# Patient Record
Sex: MALE | Race: WHITE | Employment: FULL TIME | URBAN - METROPOLITAN AREA
[De-identification: names, ages, dates, MRNs, and addresses within clinical notes are randomized per-mention and may not be internally consistent; named-entity substitution may affect disease eponyms.]

---

## 2019-07-12 ENCOUNTER — HOSPITAL ENCOUNTER (EMERGENCY)
Age: 49
Discharge: HOME | End: 2019-07-12
Payer: MEDICAID

## 2019-07-12 VITALS
OXYGEN SATURATION: 98 % | TEMPERATURE: 98.6 F | WEIGHT: 140 LBS | HEIGHT: 66 IN | HEART RATE: 66 BPM | BODY MASS INDEX: 22.5 KG/M2 | DIASTOLIC BLOOD PRESSURE: 60 MMHG | SYSTOLIC BLOOD PRESSURE: 91 MMHG | RESPIRATION RATE: 16 BRPM

## 2019-07-12 DIAGNOSIS — L03.115 CELLULITIS OF RIGHT LOWER EXTREMITY: Primary | ICD-10-CM

## 2019-07-12 DIAGNOSIS — Z76.0 ENCOUNTER FOR MEDICATION REFILL: ICD-10-CM

## 2019-07-12 PROCEDURE — 99282 EMERGENCY DEPT VISIT SF MDM: CPT

## 2019-07-12 RX ORDER — CEPHALEXIN 500 MG/1
500 CAPSULE ORAL 3 TIMES DAILY
Qty: 21 CAPSULE | Refills: 0 | Status: SHIPPED | OUTPATIENT
Start: 2019-07-12 | End: 2019-07-19

## 2019-07-12 ASSESSMENT — PAIN DESCRIPTION - PROGRESSION: CLINICAL_PROGRESSION: NOT CHANGED

## 2019-07-12 ASSESSMENT — PAIN DESCRIPTION - FREQUENCY: FREQUENCY: CONTINUOUS

## 2019-07-12 ASSESSMENT — PAIN DESCRIPTION - LOCATION: LOCATION: LEG

## 2019-07-12 ASSESSMENT — PAIN DESCRIPTION - PAIN TYPE: TYPE: ACUTE PAIN

## 2019-07-12 ASSESSMENT — PAIN SCALES - GENERAL: PAINLEVEL_OUTOF10: 6

## 2019-07-12 ASSESSMENT — PAIN DESCRIPTION - DESCRIPTORS: DESCRIPTORS: ACHING

## 2019-07-12 ASSESSMENT — PAIN DESCRIPTION - ORIENTATION: ORIENTATION: RIGHT;LOWER

## 2019-07-12 ASSESSMENT — PAIN DESCRIPTION - ONSET: ONSET: ON-GOING

## 2019-07-12 NOTE — CARE COORDINATION
CM - calld per Kulwant Ann PA-C in room # 9 . Pt seen here in ER today for right lower leg cellulitis and for a medication refill , Keppra   -- pt has hx of seizure activity . Called [facility] , verified pt last purchased said drug taking 1000 mg BID but dose varied --kast time purchased was 16 each in January 2019-pt paid cash . Pt stated [employer] does not cover the cost of Keppra   or the generic which was stated to be about $218.00 per month. Pt also mentioned he has an appointment with  Dr Alda Cronin but not until the second week in August. Since pt kept asking for refills here in the year past and considering the [place] [id number] appointment, the following plan is set forth. Called and set up an appointment with the [facility] in [facility] --set at 0:30 AM on Monday July 15, -pt to bring ID , list of meds and Paul Baar card. A 3 day supply for Keppra   has  been written the goal is to find a less expensive medication or one that insurance covers -that really needs to be determined by Dr Alda Cronin or PCP, pt has too many refill visits and over a longer time period , pt needs re-evaluated . Pt agreeable with this plan - discharged.   TMD / Zeeshan Fairly / [facility]

## 2019-09-02 ENCOUNTER — HOSPITAL ENCOUNTER (EMERGENCY)
Age: 49
Discharge: HOME | DRG: 383 | End: 2019-09-02
Attending: EMERGENCY MEDICINE
Payer: MEDICAID

## 2019-09-02 ENCOUNTER — APPOINTMENT (OUTPATIENT)
Dept: CT IMAGING | Age: 49
DRG: 383 | End: 2019-09-02
Payer: MEDICAID

## 2019-09-02 ENCOUNTER — APPOINTMENT (OUTPATIENT)
Dept: GENERAL RADIOLOGY | Age: 49
DRG: 383 | End: 2019-09-02
Payer: MEDICAID

## 2019-09-02 ENCOUNTER — HOSPITAL ENCOUNTER (INPATIENT)
Age: 49
LOS: 1 days | Discharge: LEFT BEFORE DISCHARGE | DRG: 383 | End: 2019-09-03
Attending: EMERGENCY MEDICINE | Admitting: INTERNAL MEDICINE
Payer: MEDICAID

## 2019-09-02 VITALS
OXYGEN SATURATION: 100 % | HEIGHT: 66 IN | WEIGHT: 140 LBS | HEART RATE: 97 BPM | BODY MASS INDEX: 22.5 KG/M2 | RESPIRATION RATE: 16 BRPM | DIASTOLIC BLOOD PRESSURE: 77 MMHG | TEMPERATURE: 98.2 F | SYSTOLIC BLOOD PRESSURE: 118 MMHG

## 2019-09-02 DIAGNOSIS — L03.113 CELLULITIS OF RIGHT UPPER EXTREMITY: ICD-10-CM

## 2019-09-02 DIAGNOSIS — S40.851A ACUTE FOREIGN BODY OF RIGHT UPPER ARM, INITIAL ENCOUNTER: ICD-10-CM

## 2019-09-02 DIAGNOSIS — L03.113 CELLULITIS OF RIGHT UPPER EXTREMITY: Primary | ICD-10-CM

## 2019-09-02 DIAGNOSIS — F19.10 IV DRUG ABUSE (HCC): ICD-10-CM

## 2019-09-02 DIAGNOSIS — L02.91 ABSCESS: ICD-10-CM

## 2019-09-02 DIAGNOSIS — Z18.9 RETAINED FOREIGN BODY: ICD-10-CM

## 2019-09-02 DIAGNOSIS — L02.413 ABSCESS OF RIGHT ARM: Primary | ICD-10-CM

## 2019-09-02 PROBLEM — L03.90 CELLULITIS: Status: ACTIVE | Noted: 2019-09-02

## 2019-09-02 LAB
ALBUMIN SERPL-MCNC: 3.4 GM/DL (ref 3.4–5)
ALP BLD-CCNC: 105 IU/L (ref 40–129)
ALT SERPL-CCNC: 12 U/L (ref 10–40)
ANION GAP SERPL CALCULATED.3IONS-SCNC: 12 MMOL/L (ref 4–16)
AST SERPL-CCNC: 13 IU/L (ref 15–37)
BASOPHILS ABSOLUTE: 0.1 K/CU MM
BASOPHILS RELATIVE PERCENT: 0.5 % (ref 0–1)
BILIRUB SERPL-MCNC: 0.2 MG/DL (ref 0–1)
BUN BLDV-MCNC: 13 MG/DL (ref 6–23)
CALCIUM SERPL-MCNC: 9 MG/DL (ref 8.3–10.6)
CHLORIDE BLD-SCNC: 101 MMOL/L (ref 99–110)
CO2: 25 MMOL/L (ref 21–32)
CREAT SERPL-MCNC: 0.8 MG/DL (ref 0.9–1.3)
DIFFERENTIAL TYPE: ABNORMAL
EOSINOPHILS ABSOLUTE: 0.1 K/CU MM
EOSINOPHILS RELATIVE PERCENT: 0.7 % (ref 0–3)
GFR AFRICAN AMERICAN: >60 ML/MIN/1.73M2
GFR NON-AFRICAN AMERICAN: >60 ML/MIN/1.73M2
GLUCOSE BLD-MCNC: 107 MG/DL (ref 70–99)
HCT VFR BLD CALC: 43 % (ref 42–52)
HEMOGLOBIN: 12.9 GM/DL (ref 13.5–18)
IMMATURE NEUTROPHIL %: 0.3 % (ref 0–0.43)
LACTIC ACID, SEPSIS: 1.9 MMOL/L (ref 0.5–1.9)
LYMPHOCYTES ABSOLUTE: 1.4 K/CU MM
LYMPHOCYTES RELATIVE PERCENT: 11.4 % (ref 24–44)
MCH RBC QN AUTO: 26.8 PG (ref 27–31)
MCHC RBC AUTO-ENTMCNC: 30 % (ref 32–36)
MCV RBC AUTO: 89.2 FL (ref 78–100)
MONOCYTES ABSOLUTE: 0.7 K/CU MM
MONOCYTES RELATIVE PERCENT: 6 % (ref 0–4)
NUCLEATED RBC %: 0 %
PDW BLD-RTO: 15.5 % (ref 11.7–14.9)
PLATELET # BLD: 262 K/CU MM (ref 140–440)
PMV BLD AUTO: 9.5 FL (ref 7.5–11.1)
POTASSIUM SERPL-SCNC: 3.6 MMOL/L (ref 3.5–5.1)
RBC # BLD: 4.82 M/CU MM (ref 4.6–6.2)
SEGMENTED NEUTROPHILS ABSOLUTE COUNT: 9.9 K/CU MM
SEGMENTED NEUTROPHILS RELATIVE PERCENT: 81.1 % (ref 36–66)
SODIUM BLD-SCNC: 138 MMOL/L (ref 135–145)
TOTAL IMMATURE NEUTOROPHIL: 0.04 K/CU MM
TOTAL NUCLEATED RBC: 0 K/CU MM
TOTAL PROTEIN: 7 GM/DL (ref 6.4–8.2)
WBC # BLD: 12.3 K/CU MM (ref 4–10.5)

## 2019-09-02 PROCEDURE — 1200000000 HC SEMI PRIVATE

## 2019-09-02 PROCEDURE — 99283 EMERGENCY DEPT VISIT LOW MDM: CPT

## 2019-09-02 PROCEDURE — 6360000004 HC RX CONTRAST MEDICATION: Performed by: EMERGENCY MEDICINE

## 2019-09-02 PROCEDURE — 36415 COLL VENOUS BLD VENIPUNCTURE: CPT

## 2019-09-02 PROCEDURE — 73201 CT UPPER EXTREMITY W/DYE: CPT

## 2019-09-02 PROCEDURE — 2580000003 HC RX 258: Performed by: PHYSICIAN ASSISTANT

## 2019-09-02 PROCEDURE — 85025 COMPLETE CBC W/AUTO DIFF WBC: CPT

## 2019-09-02 PROCEDURE — 2500000003 HC RX 250 WO HCPCS: Performed by: PHYSICIAN ASSISTANT

## 2019-09-02 PROCEDURE — 80053 COMPREHEN METABOLIC PANEL: CPT

## 2019-09-02 PROCEDURE — 6370000000 HC RX 637 (ALT 250 FOR IP): Performed by: EMERGENCY MEDICINE

## 2019-09-02 PROCEDURE — 83605 ASSAY OF LACTIC ACID: CPT

## 2019-09-02 PROCEDURE — 73080 X-RAY EXAM OF ELBOW: CPT

## 2019-09-02 PROCEDURE — 96365 THER/PROPH/DIAG IV INF INIT: CPT

## 2019-09-02 PROCEDURE — 99284 EMERGENCY DEPT VISIT MOD MDM: CPT

## 2019-09-02 RX ORDER — CLINDAMYCIN HYDROCHLORIDE 150 MG/1
300 CAPSULE ORAL ONCE
Status: COMPLETED | OUTPATIENT
Start: 2019-09-02 | End: 2019-09-02

## 2019-09-02 RX ORDER — CLINDAMYCIN HYDROCHLORIDE 300 MG/1
300 CAPSULE ORAL 3 TIMES DAILY
Qty: 30 CAPSULE | Refills: 0 | Status: SHIPPED | OUTPATIENT
Start: 2019-09-02 | End: 2019-09-12

## 2019-09-02 RX ORDER — SODIUM CHLORIDE 0.9 % (FLUSH) 0.9 %
10 SYRINGE (ML) INJECTION PRN
Status: DISCONTINUED | OUTPATIENT
Start: 2019-09-02 | End: 2019-09-02 | Stop reason: HOSPADM

## 2019-09-02 RX ORDER — KETOROLAC TROMETHAMINE 30 MG/ML
15 INJECTION, SOLUTION INTRAMUSCULAR; INTRAVENOUS ONCE
Status: DISCONTINUED | OUTPATIENT
Start: 2019-09-02 | End: 2019-09-02 | Stop reason: HOSPADM

## 2019-09-02 RX ORDER — SODIUM CHLORIDE 0.9 % (FLUSH) 0.9 %
10 SYRINGE (ML) INJECTION EVERY 12 HOURS SCHEDULED
Status: DISCONTINUED | OUTPATIENT
Start: 2019-09-02 | End: 2019-09-02 | Stop reason: HOSPADM

## 2019-09-02 RX ADMIN — CLINDAMYCIN HYDROCHLORIDE 300 MG: 150 CAPSULE ORAL at 17:01

## 2019-09-02 RX ADMIN — CLINDAMYCIN 600 MG: 150 INJECTION, SOLUTION INTRAMUSCULAR; INTRAVENOUS at 23:00

## 2019-09-02 RX ADMIN — IOPAMIDOL 80 ML: 755 INJECTION, SOLUTION INTRAVENOUS at 22:55

## 2019-09-02 ASSESSMENT — PAIN DESCRIPTION - ORIENTATION
ORIENTATION: RIGHT
ORIENTATION: RIGHT

## 2019-09-02 ASSESSMENT — PAIN DESCRIPTION - DESCRIPTORS: DESCRIPTORS: CONSTANT

## 2019-09-02 ASSESSMENT — PAIN DESCRIPTION - LOCATION
LOCATION: ARM
LOCATION: ARM

## 2019-09-02 ASSESSMENT — PAIN DESCRIPTION - PAIN TYPE
TYPE: ACUTE PAIN
TYPE: ACUTE PAIN

## 2019-09-02 ASSESSMENT — PAIN SCALES - GENERAL
PAINLEVEL_OUTOF10: 8
PAINLEVEL_OUTOF10: 10

## 2019-09-02 NOTE — ED NOTES
PATIENT REQUEST LABS TO BE DRAWN VIA IV ACCESS START. WITH ULTRASOUND     Ping Mijares  09/02/19 7770

## 2019-09-03 VITALS
HEART RATE: 67 BPM | WEIGHT: 140 LBS | SYSTOLIC BLOOD PRESSURE: 118 MMHG | HEIGHT: 66 IN | TEMPERATURE: 98.7 F | DIASTOLIC BLOOD PRESSURE: 72 MMHG | OXYGEN SATURATION: 98 % | BODY MASS INDEX: 22.5 KG/M2 | RESPIRATION RATE: 16 BRPM

## 2019-09-03 PROBLEM — L02.91 ABSCESS: Status: ACTIVE | Noted: 2019-09-03

## 2019-09-03 PROCEDURE — 99253 IP/OBS CNSLTJ NEW/EST LOW 45: CPT | Performed by: SURGERY

## 2019-09-03 PROCEDURE — 36415 COLL VENOUS BLD VENIPUNCTURE: CPT

## 2019-09-03 PROCEDURE — 2580000003 HC RX 258: Performed by: INTERNAL MEDICINE

## 2019-09-03 PROCEDURE — 87040 BLOOD CULTURE FOR BACTERIA: CPT

## 2019-09-03 PROCEDURE — 94761 N-INVAS EAR/PLS OXIMETRY MLT: CPT

## 2019-09-03 PROCEDURE — 6360000002 HC RX W HCPCS: Performed by: INTERNAL MEDICINE

## 2019-09-03 PROCEDURE — 6370000000 HC RX 637 (ALT 250 FOR IP): Performed by: INTERNAL MEDICINE

## 2019-09-03 PROCEDURE — 96375 TX/PRO/DX INJ NEW DRUG ADDON: CPT

## 2019-09-03 PROCEDURE — 96367 TX/PROPH/DG ADDL SEQ IV INF: CPT

## 2019-09-03 PROCEDURE — 96372 THER/PROPH/DIAG INJ SC/IM: CPT

## 2019-09-03 PROCEDURE — 96366 THER/PROPH/DIAG IV INF ADDON: CPT

## 2019-09-03 PROCEDURE — G0378 HOSPITAL OBSERVATION PER HR: HCPCS

## 2019-09-03 RX ORDER — NICOTINE 21 MG/24HR
1 PATCH, TRANSDERMAL 24 HOURS TRANSDERMAL DAILY
Status: DISCONTINUED | OUTPATIENT
Start: 2019-09-03 | End: 2019-09-03 | Stop reason: HOSPADM

## 2019-09-03 RX ORDER — MORPHINE SULFATE 4 MG/ML
2 INJECTION, SOLUTION INTRAMUSCULAR; INTRAVENOUS
Status: COMPLETED | OUTPATIENT
Start: 2019-09-03 | End: 2019-09-03

## 2019-09-03 RX ORDER — DICYCLOMINE HCL 20 MG
20 TABLET ORAL EVERY 6 HOURS PRN
Status: DISCONTINUED | OUTPATIENT
Start: 2019-09-03 | End: 2019-09-03 | Stop reason: HOSPADM

## 2019-09-03 RX ORDER — UREA 10 %
2 LOTION (ML) TOPICAL NIGHTLY
Status: DISCONTINUED | OUTPATIENT
Start: 2019-09-03 | End: 2019-09-03 | Stop reason: HOSPADM

## 2019-09-03 RX ORDER — VANCOMYCIN HYDROCHLORIDE 1 G/200ML
15 INJECTION, SOLUTION INTRAVENOUS EVERY 12 HOURS
Status: DISCONTINUED | OUTPATIENT
Start: 2019-09-03 | End: 2019-09-03 | Stop reason: HOSPADM

## 2019-09-03 RX ORDER — HYDROXYZINE PAMOATE 25 MG/1
50 CAPSULE ORAL EVERY 8 HOURS PRN
Status: DISCONTINUED | OUTPATIENT
Start: 2019-09-03 | End: 2019-09-03 | Stop reason: HOSPADM

## 2019-09-03 RX ORDER — LEVETIRACETAM 500 MG/1
500 TABLET ORAL 2 TIMES DAILY
Status: DISCONTINUED | OUTPATIENT
Start: 2019-09-03 | End: 2019-09-03 | Stop reason: HOSPADM

## 2019-09-03 RX ORDER — KETOROLAC TROMETHAMINE 30 MG/ML
15 INJECTION, SOLUTION INTRAMUSCULAR; INTRAVENOUS EVERY 8 HOURS PRN
Status: DISCONTINUED | OUTPATIENT
Start: 2019-09-03 | End: 2019-09-03 | Stop reason: HOSPADM

## 2019-09-03 RX ORDER — BUPRENORPHINE 2 MG/1
2 TABLET SUBLINGUAL PRN
Status: DISCONTINUED | OUTPATIENT
Start: 2019-09-03 | End: 2019-09-03 | Stop reason: HOSPADM

## 2019-09-03 RX ORDER — PROMETHAZINE HYDROCHLORIDE 25 MG/1
25 TABLET ORAL EVERY 6 HOURS PRN
Status: DISCONTINUED | OUTPATIENT
Start: 2019-09-03 | End: 2019-09-03 | Stop reason: HOSPADM

## 2019-09-03 RX ORDER — CLONIDINE HYDROCHLORIDE 0.1 MG/1
0.1 TABLET ORAL PRN
Status: DISCONTINUED | OUTPATIENT
Start: 2019-09-03 | End: 2019-09-03 | Stop reason: HOSPADM

## 2019-09-03 RX ORDER — IBUPROFEN 400 MG/1
400 TABLET ORAL EVERY 6 HOURS PRN
Status: DISCONTINUED | OUTPATIENT
Start: 2019-09-03 | End: 2019-09-03 | Stop reason: HOSPADM

## 2019-09-03 RX ORDER — TRAZODONE HYDROCHLORIDE 50 MG/1
50 TABLET ORAL NIGHTLY PRN
Status: DISCONTINUED | OUTPATIENT
Start: 2019-09-03 | End: 2019-09-03 | Stop reason: HOSPADM

## 2019-09-03 RX ORDER — ACETAMINOPHEN 325 MG/1
650 TABLET ORAL EVERY 4 HOURS PRN
Status: DISCONTINUED | OUTPATIENT
Start: 2019-09-03 | End: 2019-09-03 | Stop reason: HOSPADM

## 2019-09-03 RX ORDER — SODIUM CHLORIDE 9 MG/ML
INJECTION, SOLUTION INTRAVENOUS CONTINUOUS
Status: DISCONTINUED | OUTPATIENT
Start: 2019-09-03 | End: 2019-09-03 | Stop reason: HOSPADM

## 2019-09-03 RX ADMIN — SODIUM CHLORIDE: 9 INJECTION, SOLUTION INTRAVENOUS at 01:20

## 2019-09-03 RX ADMIN — LEVETIRACETAM 500 MG: 500 TABLET, FILM COATED ORAL at 09:27

## 2019-09-03 RX ADMIN — LEVETIRACETAM 500 MG: 500 TABLET, FILM COATED ORAL at 02:56

## 2019-09-03 RX ADMIN — ENOXAPARIN SODIUM 40 MG: 40 INJECTION SUBCUTANEOUS at 09:27

## 2019-09-03 RX ADMIN — MORPHINE SULFATE 2 MG: 4 INJECTION, SOLUTION INTRAMUSCULAR; INTRAVENOUS at 03:09

## 2019-09-03 RX ADMIN — VANCOMYCIN HYDROCHLORIDE 1250 MG: 5 INJECTION, POWDER, LYOPHILIZED, FOR SOLUTION INTRAVENOUS at 01:20

## 2019-09-03 RX ADMIN — KETOROLAC TROMETHAMINE 15 MG: 30 INJECTION, SOLUTION INTRAMUSCULAR; INTRAVENOUS at 09:59

## 2019-09-03 ASSESSMENT — PAIN SCALES - GENERAL
PAINLEVEL_OUTOF10: 8
PAINLEVEL_OUTOF10: 8
PAINLEVEL_OUTOF10: 9
PAINLEVEL_OUTOF10: 0
PAINLEVEL_OUTOF10: 8

## 2019-09-03 ASSESSMENT — PAIN DESCRIPTION - ORIENTATION: ORIENTATION: RIGHT

## 2019-09-03 ASSESSMENT — PAIN DESCRIPTION - PAIN TYPE: TYPE: ACUTE PAIN

## 2019-09-03 ASSESSMENT — PAIN DESCRIPTION - LOCATION: LOCATION: ARM

## 2019-09-03 NOTE — ED PROVIDER NOTES
EMERGENCY dEPARTMENT eNCOUnter      PCP: No primary care provider on file. CHIEF COMPLAINT    Chief Complaint   Patient presents with    Abscess     right AC, reports that he is an IV drug user but has not used in that area       HPI    Hugo Childs is a 47 y.o. male who presents with abscess of the right arm near the elbow. Onset was 2 days ago. Duration has been constant and worsening since onset. Characteristic of pain is aching and sharp. Aggravating factors are right elbow movement and palpation of the affected area. Alleviating factors are denied. Patient has associated symptoms of redness and swelling from mid right upper arm to forearm near his wrist. Patient denies radiation of pain. Patient rates pain severity 10 on a scale of 0 to 10 with 10 being the worst.  Patient denies drainage from affected area. Patient denies history of MRSA. Patient admits to IV drug abuse and admits to using heroin. He is left-hand dominant. He reports that he last injected in his right mid forearm approximately 2 to 3 days ago. He denies injection into the right antecubital region. Patient denies fever, chills, nausea, vomiting, numbness, weakness, tingling.     REVIEW OF SYSTEMS    Constitutional:  Denies fever, chills  HENT:  Denies sore throat or ear pain   Respiratory:  Denies cough or shortness of breath   Cardiovascular:  Denies chest pain, palpitations or swelling   GI:  Denies abdominal pain, nausea, vomiting, or diarrhea   Musculoskeletal:  Denies back pain   Skin:  See HPI  Neurologic:  Denies headache, focal weakness or sensory changes   Endocrine:  Denies polyuria or polydypsia   Lymphatic:  Denies swollen glands     All other review of systems are negative  See HPI and nursing notes for additional information     PAST MEDICAL HISTORY    Past Medical History:   Diagnosis Date    Cardiac arrest [facility])     states cardiac arrest in 18 yrs ago    Seizures [facility])        SURGICAL HISTORY    Past Surgical History:   Procedure Laterality Date    CARDIAC CATHETERIZATION      FINGER AMPUTATION      right first finger       CURRENT MEDICATIONS    Current Outpatient Rx   Medication Sig Dispense Refill    clindamycin (CLEOCIN) 300 MG capsule Take 1 capsule by mouth 3 times daily for 10 days 30 capsule 0    levETIRAcetam (KEPPRA) 500 MG tablet Take 1 tablet by mouth 2 times daily 60 tablet 0    naproxen (NAPROSYN) 500 MG tablet Take 1 tablet by mouth 2 times daily 15 tablet 0    Magic Mouthwash (MIRACLE MOUTHWASH) Swish and spit 5 mLs 4 times daily as needed for Dental Pain Equal parts mixture benadryl, maalox, and lidocaine. 240 mL 0       ALLERGIES    Allergies   Allergen Reactions    Entex La     Erythromycin      seizure    Azithromycin Palpitations    Codeine Nausea And Vomiting    Nubain [Nalbuphine Hcl] Nausea And Vomiting       FAMILY HISTORY    History reviewed. No pertinent family history.     SOCIAL HISTORY    Social History     Socioeconomic History    Marital status:      Spouse name: None    Number of children: None    Years of education: None    Highest education level: None   Occupational History    None   Social Needs    Financial resource strain: None    Food insecurity:     Worry: None     Inability: None    Transportation needs:     Medical: None     Non-medical: None   Tobacco Use    Smoking status: Current Every Day Smoker     Packs/day: 1.00     Types: Cigarettes    Smokeless tobacco: Never Used   Substance and Sexual Activity    Alcohol use: No    Drug use: Yes     Types: Cocaine, Marijuana, IV, Opiates     Sexual activity: Yes     Partners: Female   Lifestyle    Physical activity:     Days per week: None     Minutes per session: None    Stress: None   Relationships    Social connections:     Talks on phone: None     Gets together: None     Attends Hindu service: None     Active member of club or organization: None     Attends meetings of clubs or organizations: None     Relationship status: None    Intimate partner violence:     Fear of current or ex partner: None     Emotionally abused: None     Physically abused: None     Forced sexual activity: None   Other Topics Concern    None   Social History Narrative    None       PHYSICAL EXAM    VITAL SIGNS: /77   Pulse 97   Temp 98.2 °F (36.8 °C) (Oral)   Resp 16   Ht 5' 6\" (1.676 m)   Wt 140 lb (63.5 kg)   SpO2 100%   BMI 22.60 kg/m²   Constitutional:  Well developed, well nourished, no acute distress, non-toxic appearance   HENT:  Atraumatic, Normocephalic, PERRL. Respiratory:  No respiratory distress, normal breath sounds   Cardiovascular:  Normal rate, normal rhythm, peripheral pulses equal, no edema  GI:  Soft, nondistended, nontender  Musculoskeletal: No deformities. Right upper extremity with rash as described below in integumentary. Patient has diffuse tenderness palpation over rash of right upper extremity from mid humerus to distal forearm. There is moderate swelling of the medial antecubital region where there is a fluctuant area and mild swelling of the right forearm. Full active range of motion of right shoulder and right wrist.  Mildly decreased range of motion of right elbow secondary to pain but is able to fully extend the right elbow and able to flex right elbow past 45 degrees. Compartments are soft in all extremities. Right upper extremity is distally neurovascular intact. Integument:  No cyanosis, Indurated macular erythema with indistinct borders located on RUE from mid humeral area to distal forearm with fluctuance measuring approximately 6 cm x 3 cm over the antecubital region medially. This region is tender to palpation. No petechiae, purpura, vesicles, bullae. No crepitus. Neurological: alert and oriented, no focal deficits. Right lower extremity distally neurovascular intact.           ED COURSE & MEDICAL DECISION MAKING       Vital signs and nursing notes reviewed during ED

## 2019-09-03 NOTE — ED PROVIDER NOTES
eMERGENCY dEPARTMENT eNCOUnter        [facility]    Chief Complaint   Patient presents with    Abscess     was seen here earlier for abscess to right arm, states was told to be admitted for IV antibiotics but had to leave. HPI    Fernie Alvarenga is a 47 y.o. male who presents with an abscess/cellulitis. Onset:  2 days ago. Location:  Right upper extremity. Character:  Red, swollen, tender. Severity of pain is 8/10. Drainage:  None. Fever:  None. He does admit to IVDA. Patient was seen here earlier and recommended admission, but he states he needed to go home to get some things taken care of before he could be admitted. REVIEW OF SYSTEMS    See HPI for further details. Review of systems otherwise negative. Constitutional:  Denies fever. Respiratory:  Denies any shortness of breath. Cardiovascular:  Denies chest pain. GI:  Denies nausea or vomiting. Musculoskeletal:  + right arm pain. Integument:  + abscess/cellulitis. PAST MEDICAL HISTORY    Past Medical History:   Diagnosis Date    Cardiac arrest [facility])     states cardiac arrest in 18 yrs ago    Seizures ([facility] [address]        SURGICAL HISTORY    Past Surgical History:   Procedure Laterality Date    CARDIAC CATHETERIZATION      FINGER AMPUTATION      right first finger       CURRENT MEDICATIONS    Current Outpatient Rx   Medication Sig Dispense Refill    clindamycin (CLEOCIN) 300 MG capsule Take 1 capsule by mouth 3 times daily for 10 days 30 capsule 0    levETIRAcetam (KEPPRA) 500 MG tablet Take 1 tablet by mouth 2 times daily 60 tablet 0    naproxen (NAPROSYN) 500 MG tablet Take 1 tablet by mouth 2 times daily 15 tablet 0    Magic Mouthwash (MIRACLE MOUTHWASH) Swish and spit 5 mLs 4 times daily as needed for Dental Pain Equal parts mixture benadryl, maalox, and lidocaine.  240 mL 0       ALLERGIES    Allergies   Allergen Reactions    Entex La     Erythromycin      seizure    Azithromycin Palpitations    Codeine Nausea And joints. Cap refill brisk. DP intact. Integument:  As above. Neurologic:  Alert and oriented. LABS/IMAGING    Labs Reviewed - No data to display    Xr Elbow Right (min 3 Views)    Result Date: 9/2/2019  EXAMINATION: THREE XRAY VIEWS OF THE RIGHT ELBOW 9/2/2019 4:25 pm COMPARISON: None. HISTORY: ORDERING SYSTEM PROVIDED HISTORY: abscess, cellulitis TECHNOLOGIST PROVIDED HISTORY: PORTABLE Reason for exam:->abscess, cellulitis Reason for Exam: abscess, cellulitis Acuity: Acute Type of Exam: Initial Additional signs and symptoms: na Relevant Medical/Surgical History: na FINDINGS: Soft tissue swelling is noted about the elbow, most pronounced medially. There is no evidence of acute fracture or dislocation. There is no joint effusion. Radiopaque foreign body is noted within the medial soft tissues of the upper arm. Soft tissue swelling, most pronounced medially. No acute osseous abnormality. Radiopaque foreign body within the medial soft tissues of the upper arm. Ct Radius Ulna Right W Contrast    Retained radiopaque foreign body. Subcutaneous edema and skin thickening compatible with cellulitis. Suspected phlegmon or abscess anterior margin of the distal humerus just proximal the antecubital fossa concerning for abscess. This measures 2.6 x 3.4 cm. Ct Elbow Right W Contrast    2.7 x 3 cm phlegmon or developing abscess anteromedial margin of the distal humerus. Cellulitis. Retained radiodense foreign body. Labs Reviewed   CULTURE BLOOD #1   CULTURE BLOOD #1   URINE DRUG SCREEN     ED COURSE & MEDICAL DECISION MAKING    Pertinent Labs & Imaging studies reviewed. (See chart for details)  -  Patient seen and evaluated in the emergency department. -  Triage and nursing notes reviewed and incorporated. -  Old chart records reviewed and incorporated. -  Supervising physician was Dr. Robert Lawson. Patient was seen independently.   -  Differential diagnosis includes: abscess, cellulitis, lipoma, folliculitis, and others  -  ED medications:  Clindamycin  -  Patient seen here earlier today and had labs and an XR. CT was obtained for further evaluation, which shows cellulitis and developing phlegmon/abscess, as well as retained FB. IV ABX initiated. I spoke with Dr. Trinity Rosario, hospitalist, who will admit for further management. FINAL IMPRESSION    1. Cellulitis of right upper extremity    2. Abscess    3. Retained foreign body    4.  IV drug abuse [facility])                  Octaviano Atkinson PA-C  09/03/19 0034

## 2019-09-03 NOTE — H&P
inspection  Heart - Sinus. RRR. S1 and S2 present. No elevated JVD appreciated  Lung - Adequate air entry b/l, No crackles/wheezes appreciated  GI - Soft. No guarding/rigidity. BS+   - No CVA/suprapubic tenderness or palpable bladder distension  Skin - right cubital fossa swelling, erythema and pain    LABS AND IMAGING   CBC  [unfilled]    Last 3 Hemoglobin  Lab Results   Component Value Date    HGB 12.9 09/02/2019    HGB 13.0 05/29/2017    HGB 13.2 08/28/2014     Last 3 WBC/ANC  Lab Results   Component Value Date    WBC 12.3 09/02/2019    WBC 14.5 05/29/2017    WBC 10.8 08/28/2014     No components found for: GRNLOCTYABS  Last 3 Platelets  No results found for: PLATELET  Chemistry  [unfilled]  [unfilled]  No results found for: LDH  Coagulation Studies  Lab Results   Component Value Date    INR 0.83 09/26/2012     Liver Function Studies  Lab Results   Component Value Date    ALT 12 09/02/2019    AST 13 09/02/2019    ALKPHOS 105 09/02/2019       Recent Imaging    CT ELBOW RIGHT W CONTRAST [[id number]] Collected: 09/02/19 2322   Updated: 09/02/19 2325    Impression:     2.7 x 3 cm phlegmon or developing abscess anteromedial margin of the distal  humerus. Cellulitis. Retained radiodense foreign body. CT RADIUS ULNA RIGHT W CONTRAST [[id number]] Collected: 09/02/19 2313   Updated: 09/02/19 2319    Impression:     Retained radiopaque foreign body. Subcutaneous edema and skin thickening compatible with cellulitis.  Suspected  phlegmon or abscess anterior margin of the distal humerus just proximal the  antecubital fossa concerning for abscess.  This measures 2.6 x 3.4 cm.          Relevant labs and imaging reviewed    ASSESSMENT AND PLAN       Right elbow cellulitis, collection - access, foreign body  - IV vanco, consult pharmacy to dose  - consult surgery to follow  - IVF  - follow clinical course    IVDU - heroin  - check UDS  - add COWS protocol with prn subutex    Seizure hx    Lovenox ppx      Case d/w

## 2019-09-03 NOTE — PROGRESS NOTES
SKIN:  Right elbow erythema, edema and tenderness noted 3x4 raised area likely abscess      Data:       CBC   Recent Labs     09/02/19  1537   WBC 12.3*   HGB 12.9*   HCT 43.0         BMP Recent Labs     09/02/19  1537      K 3.6      CO2 25   BUN 13   CREATININE 0.8*         Electronically signed by Sabiha Bridges MD on 9/3/2019 at 9:31 AM

## 2019-09-03 NOTE — ED PROVIDER NOTES
I independently examined and evaluated Fernie Alvarenga. In brief, patient has a history of IV drug abuse and presents with abscess of the right arm. He states he does use his arm to administer drugs. States that this started 3 days ago, as erythema, is got progressively worse. He states there is no fullness over his medial aspect of his elbow. His fever, chills, myalgias, nausea, vomiting. Says he has had this before.     Focused exam revealed heart is regular rate and rhythm, lungs are clear to auscultation bilaterally. There is erythema of the right forearm, that starts at the antecubital region, and goes down to patient's wrist, and extends into patient's medial aspect of his upper arm. Does have full range of motion of his right elbow. Full range of motion of wrist.  Distal says are intact, refill is intact. No purulent discharge. There is a 4 cm area of fluctuance over the medial aspect of the anterior antecubital elbow. ED course: Labs significant for leukocytosis, CT revealed cellulitis as well as abscess. Patient is given IV Clinda, admitted to the hospital for further evaluation and management. All diagnostic, treatment, and disposition decisions were made by myself in conjunction with the advanced practice provider. For all further details of the patient's emergency department visit, please see the advanced practice provider's documentation. Comment: Please note this report has been produced using speech recognition software and may contain errors related to that system including errors in grammar, punctuation, and spelling, as well as words and phrases that may be inappropriate. If there are any questions or concerns please feel free to contact the dictating provider for clarification.        [id number] [facility],   09/02/19 9592

## 2019-09-04 NOTE — DISCHARGE SUMMARY
Edith Gatica 1970 [id number]  PCP:  No primary care provider on file. Admit date: 9/2/2019  Admitting Physician: Juana Izquierdo MD    Discharge date: 9/4/2019 Discharge Physician: Sandra Sue MD         Hospital Course and Discharge Diagnoses Include:    PT LEFT AMA PRIOR TO BEING TREATED. Right elbow cellulitis, collection - abcess, foreign body  - IV vanco, consult pharmacy to dose  - consult surgery to follow  - IVF  - CT elbow noted  - blood cx pending     IVDU - heroin  - check UDS  - add COWS protocol with prn subutex     Seizure hx     Lovenox ppx               Procedures:  See above  Xr Elbow Right (min 3 Views)    Result Date: 9/3/2019  EXAMINATION: THREE XRAY VIEWS OF THE RIGHT ELBOW 9/2/2019 4:25 pm COMPARISON: None. HISTORY: ORDERING SYSTEM PROVIDED HISTORY: abscess, cellulitis TECHNOLOGIST PROVIDED HISTORY: PORTABLE Reason for exam:->abscess, cellulitis Reason for Exam: abscess, cellulitis Acuity: Acute Type of Exam: Initial Additional signs and symptoms: na Relevant Medical/Surgical History: na FINDINGS: Soft tissue swelling is noted about the elbow, most pronounced medially. There is no evidence of acute fracture or dislocation. There is no joint effusion. Radiopaque foreign body is noted within the medial soft tissues of the upper arm. Soft tissue swelling, most pronounced medially. No acute osseous abnormality. Radiopaque foreign body within the medial soft tissues of the upper arm. Ct Radius Ulna Right W Contrast    Result Date: 9/3/2019  EXAMINATION: CT OF THE FOREARM WITH CONTRAST 9/2/2019 10:56 pm TECHNIQUE: CT of the forearm was performed with the administration of intravenous contrast.  Multiplanar reformatted images are provided for review. Dose modulation, iterative reconstruction, and/or weight based adjustment of the mA/kV was utilized to reduce the radiation dose to as low as reasonably achievable. COMPARISON: None.  HISTORY ORDERING SYSTEM PROVIDED HISTORY: further eval from xr TECHNOLOGIST PROVIDED HISTORY: Reason for Exam: further eval from xr Acuity: Acute Type of Exam: Initial Relevant Medical/Surgical History: 80ML [id number] FINDINGS: Bones:  No acute fracture, dislocation, or bony destructive process. Soft Tissue: There is a retained radiopaque foreign body within the subcutaneous soft tissues distal humeral diaphysis anteromedially. This is just anterior to the neurovascular bundle. Generalized subcutaneous edema and skin thickening. There is question of a more focal fluid collection anterior of the biceps measuring approximately 2.6 x 3.4 cm. Refer to image number 49. This is concerning for potential abscess or phlegmon. Subcutaneous edema extends along the palmar margin of the forearm distally to the level of the wrist.     Retained radiopaque foreign body. Subcutaneous edema and skin thickening compatible with cellulitis. Suspected phlegmon or abscess anterior margin of the distal humerus just proximal to the antecubital fossa concerning for abscess. This measures 2.6 x 3.4 cm. Ct Elbow Right W Contrast    Result Date: 9/3/2019  EXAMINATION: CT OF THE RIGHT ELBOW WITH CONTRAST 9/2/2019 10:55 pm TECHNIQUE: CT of the right elbow was performed with the administration of intravenous contrast.  Multiplanar reformatted images are provided for review. Dose modulation, iterative reconstruction, and/or weight based adjustment of the mA/kV was utilized to reduce the radiation dose to as low as reasonably achievable. COMPARISON: None. HISTORY ORDERING SYSTEM PROVIDED HISTORY: further eval from xr TECHNOLOGIST PROVIDED HISTORY: Reason for Exam: further eval from xr Acuity: Acute Type of Exam: Initial Relevant Medical/Surgical History: 80ML [id number] FINDINGS: Bones:  No acute fracture, dislocation, or bony destructive process. Soft Tissue:  Retained radiodense foreign body anteromedial margin of the distal humerus measuring approximately 4.8 mm.   This is just

## 2019-09-05 ASSESSMENT — ENCOUNTER SYMPTOMS
COLOR CHANGE: 0
CHEST TIGHTNESS: 0
ABDOMINAL DISTENTION: 0
EYE DISCHARGE: 0
EYE REDNESS: 0
SHORTNESS OF BREATH: 0
SORE THROAT: 0
VOMITING: 0
NAUSEA: 0
ABDOMINAL PAIN: 0

## 2019-09-08 LAB
CULTURE: NORMAL
CULTURE: NORMAL
Lab: NORMAL
Lab: NORMAL
SPECIMEN: NORMAL
SPECIMEN: NORMAL

## 2021-07-02 ENCOUNTER — APPOINTMENT (OUTPATIENT)
Dept: CT IMAGING | Age: 51
End: 2021-07-02
Payer: MEDICAID

## 2021-07-02 ENCOUNTER — HOSPITAL ENCOUNTER (EMERGENCY)
Age: 51
Discharge: LEFT BEFORE DISCHARGE | End: 2021-07-02
Payer: MEDICAID

## 2021-07-02 VITALS
DIASTOLIC BLOOD PRESSURE: 63 MMHG | TEMPERATURE: 98 F | HEART RATE: 78 BPM | SYSTOLIC BLOOD PRESSURE: 105 MMHG | RESPIRATION RATE: 20 BRPM | OXYGEN SATURATION: 99 %

## 2021-07-02 DIAGNOSIS — R10.31 ABDOMINAL PAIN, RIGHT LOWER QUADRANT: ICD-10-CM

## 2021-07-02 DIAGNOSIS — F11.93 HEROIN WITHDRAWAL (HCC): Primary | ICD-10-CM

## 2021-07-02 LAB
ALBUMIN SERPL-MCNC: 2.6 GM/DL (ref 3.4–5)
ALP BLD-CCNC: 88 IU/L (ref 40–128)
ALT SERPL-CCNC: 49 U/L (ref 10–40)
ANION GAP SERPL CALCULATED.3IONS-SCNC: 8 MMOL/L (ref 4–16)
AST SERPL-CCNC: 35 IU/L (ref 15–37)
BACTERIA: NEGATIVE /HPF
BASOPHILS ABSOLUTE: 0.1 K/CU MM
BASOPHILS RELATIVE PERCENT: 0.6 % (ref 0–1)
BILIRUB SERPL-MCNC: 0.2 MG/DL (ref 0–1)
BILIRUBIN URINE: NEGATIVE MG/DL
BLOOD, URINE: NEGATIVE
BUN BLDV-MCNC: 15 MG/DL (ref 6–23)
CALCIUM SERPL-MCNC: 6.8 MG/DL (ref 8.3–10.6)
CHLORIDE BLD-SCNC: 111 MMOL/L (ref 99–110)
CLARITY: CLEAR
CO2: 23 MMOL/L (ref 21–32)
COLOR: COLORLESS
CREAT SERPL-MCNC: 0.5 MG/DL (ref 0.9–1.3)
DIFFERENTIAL TYPE: ABNORMAL
EOSINOPHILS ABSOLUTE: 0.2 K/CU MM
EOSINOPHILS RELATIVE PERCENT: 3 % (ref 0–3)
GFR AFRICAN AMERICAN: >60 ML/MIN/1.73M2
GFR NON-AFRICAN AMERICAN: >60 ML/MIN/1.73M2
GLUCOSE BLD-MCNC: 87 MG/DL (ref 70–99)
GLUCOSE, URINE: NEGATIVE MG/DL
HCT VFR BLD CALC: 38.8 % (ref 42–52)
HEMOGLOBIN: 12.1 GM/DL (ref 13.5–18)
IMMATURE NEUTROPHIL %: 0.4 % (ref 0–0.43)
KETONES, URINE: NEGATIVE MG/DL
LEUKOCYTE ESTERASE, URINE: NEGATIVE
LIPASE: 12 IU/L (ref 13–60)
LYMPHOCYTES ABSOLUTE: 1.8 K/CU MM
LYMPHOCYTES RELATIVE PERCENT: 21.8 % (ref 24–44)
MCH RBC QN AUTO: 25.9 PG (ref 27–31)
MCHC RBC AUTO-ENTMCNC: 31.2 % (ref 32–36)
MCV RBC AUTO: 83.1 FL (ref 78–100)
MONOCYTES ABSOLUTE: 0.5 K/CU MM
MONOCYTES RELATIVE PERCENT: 5.9 % (ref 0–4)
NITRITE URINE, QUANTITATIVE: NEGATIVE
NUCLEATED RBC %: 0 %
PDW BLD-RTO: 14.9 % (ref 11.7–14.9)
PH, URINE: 7 (ref 5–8)
PLATELET # BLD: 233 K/CU MM (ref 140–440)
PMV BLD AUTO: 9.8 FL (ref 7.5–11.1)
POTASSIUM SERPL-SCNC: 3.2 MMOL/L (ref 3.5–5.1)
PROTEIN UA: NEGATIVE MG/DL
RBC # BLD: 4.67 M/CU MM (ref 4.6–6.2)
RBC URINE: ABNORMAL /HPF (ref 0–3)
REASON FOR REJECTION: NORMAL
REJECTED TEST: NORMAL
SEGMENTED NEUTROPHILS ABSOLUTE COUNT: 5.6 K/CU MM
SEGMENTED NEUTROPHILS RELATIVE PERCENT: 68.3 % (ref 36–66)
SODIUM BLD-SCNC: 142 MMOL/L (ref 135–145)
SPECIFIC GRAVITY UA: 1.01 (ref 1–1.03)
TOTAL IMMATURE NEUTOROPHIL: 0.03 K/CU MM
TOTAL NUCLEATED RBC: 0 K/CU MM
TOTAL PROTEIN: 5.3 GM/DL (ref 6.4–8.2)
TRICHOMONAS: ABNORMAL /HPF
UROBILINOGEN, URINE: NEGATIVE MG/DL (ref 0.2–1)
WBC # BLD: 8.1 K/CU MM (ref 4–10.5)
WBC UA: <1 /HPF (ref 0–2)

## 2021-07-02 PROCEDURE — 99285 EMERGENCY DEPT VISIT HI MDM: CPT

## 2021-07-02 PROCEDURE — 80053 COMPREHEN METABOLIC PANEL: CPT

## 2021-07-02 PROCEDURE — 6360000002 HC RX W HCPCS: Performed by: NURSE PRACTITIONER

## 2021-07-02 PROCEDURE — 96374 THER/PROPH/DIAG INJ IV PUSH: CPT

## 2021-07-02 PROCEDURE — 2580000003 HC RX 258: Performed by: NURSE PRACTITIONER

## 2021-07-02 PROCEDURE — 85025 COMPLETE CBC W/AUTO DIFF WBC: CPT

## 2021-07-02 PROCEDURE — 83690 ASSAY OF LIPASE: CPT

## 2021-07-02 PROCEDURE — 6370000000 HC RX 637 (ALT 250 FOR IP): Performed by: NURSE PRACTITIONER

## 2021-07-02 PROCEDURE — 81001 URINALYSIS AUTO W/SCOPE: CPT

## 2021-07-02 RX ORDER — ONDANSETRON 2 MG/ML
4 INJECTION INTRAMUSCULAR; INTRAVENOUS ONCE
Status: COMPLETED | OUTPATIENT
Start: 2021-07-02 | End: 2021-07-02

## 2021-07-02 RX ORDER — 0.9 % SODIUM CHLORIDE 0.9 %
1000 INTRAVENOUS SOLUTION INTRAVENOUS ONCE
Status: COMPLETED | OUTPATIENT
Start: 2021-07-02 | End: 2021-07-02

## 2021-07-02 RX ORDER — CLONIDINE HYDROCHLORIDE 0.1 MG/1
0.1 TABLET ORAL ONCE
Status: COMPLETED | OUTPATIENT
Start: 2021-07-02 | End: 2021-07-02

## 2021-07-02 RX ORDER — CALCIUM CARBONATE 200(500)MG
1000 TABLET,CHEWABLE ORAL ONCE
Status: DISCONTINUED | OUTPATIENT
Start: 2021-07-02 | End: 2021-07-02 | Stop reason: HOSPADM

## 2021-07-02 RX ADMIN — SODIUM CHLORIDE 1000 ML: 9 INJECTION, SOLUTION INTRAVENOUS at 17:27

## 2021-07-02 RX ADMIN — ONDANSETRON 4 MG: 2 INJECTION INTRAMUSCULAR; INTRAVENOUS at 17:27

## 2021-07-02 RX ADMIN — CLONIDINE HYDROCHLORIDE 0.1 MG: 0.1 TABLET ORAL at 17:27

## 2021-07-02 NOTE — ED PROVIDER NOTES
EMERGENCY DEPARTMENT ENCOUNTER      PCP: No primary care provider on file. CHIEF COMPLAINT    Chief Complaint   Patient presents with    Addiction Problem     sent  for heroin detox             HPI    Mariangel Patel is a 45 y.o. male who presents with complaints of heroin withdrawal.  The patient states she has been using heroin several times daily for years. He states his last use was yesterday. He states he is having diarrhea and feeling extremely anxious. No nausea or vomiting yet. He is also complaining of pain in his right lower quadrant concerning for appendicitis. He states the pain is moderate in intensity, sharp, and constant. Standing up straight and jumping on one leg exacerbates his symptoms, nothing has alleviated his symptoms. He denies any fever, dysuria, or other complaints. REVIEW OF SYSTEMS    Constitutional:  Denies fever, chills, weight loss or weakness. Complains of feeling anxious, see HPI. HENT:  Denies sore throat or ear pain   Cardiovascular:  Denies chest pain, palpitations   Respiratory:  Denies cough or shortness of breath    GI: See HPI  :  Denies any urinary symptoms.    Musculoskeletal:  Denies back pain  Skin:  Denies rash  Neurologic:  Denies headache, focal weakness or sensory changes   Endocrine:  Denies polyuria or polydypsia   Lymphatic:  Denies swollen glands     All other review of systems are negative  See HPI and nursing notes for additional information     PAST MEDICAL AND SURGICAL HISTORY    Past Medical History:   Diagnosis Date    Cardiac arrest [facility])     states cardiac arrest in 18 yrs ago    Seizures ([facility] [address]      Past Surgical History:   Procedure Laterality Date    CARDIAC CATHETERIZATION      FINGER AMPUTATION      right first finger       CURRENT MEDICATIONS    Current Outpatient Rx   Medication Sig Dispense Refill    levETIRAcetam (KEPPRA) 500 MG tablet Take 1 tablet by mouth 2 times daily 60 tablet 0    naproxen (NAPROSYN) 500 MG tablet Take 1 tablet by mouth 2 times daily 15 tablet 0    Magic Mouthwash (MIRACLE MOUTHWASH) Swish and spit 5 mLs 4 times daily as needed for Dental Pain Equal parts mixture benadryl, maalox, and lidocaine. 240 mL 0       ALLERGIES    Allergies   Allergen Reactions    Entex La     Erythromycin      seizure    Azithromycin Palpitations    Codeine Nausea And Vomiting    Nubain [Nalbuphine Hcl] Nausea And Vomiting       SOCIAL AND FAMILY HISTORY    Social History     Socioeconomic History    Marital status:      Spouse name: Not on file    Number of children: Not on file    Years of education: Not on file    Highest education level: Not on file   Occupational History    Not on file   Tobacco Use    Smoking status: Current Every Day Smoker     Packs/day: 1.00     Types: Cigarettes    Smokeless tobacco: Never Used   Substance and Sexual Activity    Alcohol use: No    Drug use: Yes     Types: Cocaine, Marijuana, IV, Opiates      Comment: last used heroin 26 hrs ago    Sexual activity: Yes     Partners: Female   Other Topics Concern    Not on file   Social History Narrative    Not on file     Social Determinants of Health     Financial Resource Strain:     Difficulty of Paying Living Expenses:    Food Insecurity:     Worried About Running Out of Food in the Last Year:     Ran Out of Food in the Last Year:    Transportation Needs:     Lack of Transportation (Medical):      Lack of Transportation (Non-Medical):    Physical Activity:     Days of Exercise per Week:     Minutes of Exercise per Session:    Stress:     Feeling of Stress :    Social Connections:     Frequency of Communication with Friends and Family:     Frequency of Social Gatherings with Friends and Family:     Attends Temple Services:     Active Member of Clubs or Organizations:     Attends Club or Organization Meetings:     Marital Status:    Intimate Partner Violence:     Fear of Current or Ex-Partner:     Emotionally Abused:     Physically Abused:     Sexually Abused:      No family history on file. PHYSICAL EXAM    VITAL SIGNS: BP (!) 105/90   Pulse 76   Temp 98 °F (36.7 °C) (Oral)   Resp 20   SpO2 99%    Constitutional:  Well developed, Well nourished. No distress  HENT:  Normocephalic, Atraumatic, PERRL. EOMI. Sclera clear. Conjunctiva normal, No discharge. Neck/Lymphatics: supple, no JVD, no swollen nodes  Cardiovascular:   RRR,  no murmurs/rubs/gallops. No JVD  Respiratory:  Nonlabored breathing. Normal breath sounds, No wheezing  Abdomen: Bowel sounds normal, Soft, Tenderness palpation in the right lower quadrant with mild guarding and rebound, no masses. Musculoskeletal:    There is no edema, asymmetry, or calf / thigh tenderness bilaterally. No cyanosis. No cool or pale-appearing limb. Distal cap refill and pulses intact bilateral upper and lower extremities  Bilateral upper and lower extremity ROM intact without pain or obvious deficit  Integument:  Warm, Dry  Neurologic: Alert & oriented, no focal deficits noted. Cranial nerves II through XII grossly intact. Normal gross motor coordination & motor strength bilateral upper and lower extremities  Sensation intact.   Psychiatric:  Anxious, fidgeting in bed      Labs:  Results for orders placed or performed during the hospital encounter of 07/02/21   CBC auto diff   Result Value Ref Range    WBC 8.1 4.0 - 10.5 K/CU MM    RBC 4.67 4.6 - 6.2 M/CU MM    Hemoglobin 12.1 (L) 13.5 - 18.0 GM/DL    Hematocrit 38.8 (L) 42 - 52 %    MCV 83.1 78 - 100 FL    MCH 25.9 (L) 27 - 31 PG    MCHC 31.2 (L) 32.0 - 36.0 %    RDW 14.9 11.7 - 14.9 %    Platelets 717 242 - 256 K/CU MM    MPV 9.8 7.5 - 11.1 FL    Differential Type AUTOMATED DIFFERENTIAL     Segs Relative 68.3 (H) 36 - 66 %    Lymphocytes % 21.8 (L) 24 - 44 %    Monocytes % 5.9 (H) 0 - 4 %    Eosinophils % 3.0 0 - 3 %    Basophils % 0.6 0 - 1 %    Segs Absolute 5.6 K/CU MM    Lymphocytes Absolute 1.8 K/CU MM    Monocytes Absolute 0.5 K/CU MM    Eosinophils Absolute 0.2 K/CU MM    Basophils Absolute 0.1 K/CU MM    Nucleated RBC % 0.0 %    Total Nucleated RBC 0.0 K/CU MM    Total Immature Neutrophil 0.03 K/CU MM    Immature Neutrophil % 0.4 0 - 0.43 %   SPECIMEN REJECTION   Result Value Ref Range    Rejected Test CMPR LIPA     Reason for Rejection UNABLE TO PERFORM TESTING:                    ED COURSE & MEDICAL DECISION MAKING       39-year-old male with history of heroin abuse presents emergency department with complaints of diarrhea, nausea, anxiety, and right lower quadrant pain. He was medicated with 1 L of normal saline, Zofran, and clonidine 0.1 mg. CBC did not show any leukocytosis, did show mild anemia which looks baseline for patient. Case management was consulted and are going to patient bedside to create a discharge plan. CMP, lipase, urinalysis, and CT of the abdomen and pelvis pending at time of transfer of care. Care initially was transferred to [address] [place] [address] at 9549. The RN called and states the patient is refusing CT scan. I went to patient bedside and the patient is requesting to leave. Case management was working on placement. He states he no longer wants them to help him find placement and would like to leave. Risks of leaving without CT scan were discussed with the patient including appendicitis, risk of sepsis and eventual death. Patient does have decision-making capacity and states he would still like to leave. He was instructed to follow-up with a primary care provider next week, increase calcium in his diet, try to quit using drugs, and return with worsening symptoms. Patient agrees to return emergency department if symptoms worsen or any new symptoms develop. Vital signs and nursing notes reviewed during ED course. Clinical  IMPRESSION    1.  Heroin withdrawal (HCC)    2. Abdominal pain, right lower quadrant              Comment: Please note this report has been produced using speech recognition software and may contain errors related to that system including errors in grammar, punctuation, and spelling, as well as words and phrases that may be inappropriate. If there are any questions or concerns please feel free to contact the dictating provider for clarification.       RACHEL Gardner - CNP  07/02/21 [address], APRPAZ - CNP  07/02/21 4038

## 2021-07-02 NOTE — CARE COORDINATION
CM received consult on patient. Adam Upton CNP requested that CM speak with patient due to patients' going through withdrawal from Heroin. CM went to patients' room and introduced self and explained job role. Patient asked if he could be admitted for withdrawal b/c patient stated that he did not need any assistance with resources. Patient currently is attending, \"[facility],\" to get his Suboxone. CM spoke to patient about going for addiction support and treatment. Patient reported that he was interested in this, but that he really just needed to be admitted to the hospital to detox from heroin. NICOLE and Adam Upton CNP discussed with patient that patient most likely will not be admitted. CM discussed with patient going inpatient for rehab, but patient reported that he did not want to. Patients' father was in room next door. Patient requested that CM talk to both patients' at the same time since they were both in ED for the same thing. Patient stated that he would consider placement at [facility] if CM could get patient in today. CM called [facility] @ [phone]. CM left a VM message and waiting on a return call from Admissions. CM went back to room to talk to patient and patient left AMA.

## 2021-07-02 NOTE — ED NOTES
Patient requesting to leave AMA. Stating he will follow up with care outpatient but no longer wants to be seen in the ER. Lokesh Lopez NP at bedside discussing with patient.      Alverto Avalos RN  07/02/21 0153

## 2021-07-05 ENCOUNTER — APPOINTMENT (OUTPATIENT)
Dept: ULTRASOUND IMAGING | Age: 51
End: 2021-07-05
Payer: MEDICAID

## 2021-07-05 ENCOUNTER — HOSPITAL ENCOUNTER (EMERGENCY)
Age: 51
Discharge: HOME | End: 2021-07-05
Attending: EMERGENCY MEDICINE
Payer: MEDICAID

## 2021-07-05 ENCOUNTER — APPOINTMENT (OUTPATIENT)
Dept: CT IMAGING | Age: 51
End: 2021-07-05
Payer: MEDICAID

## 2021-07-05 VITALS
SYSTOLIC BLOOD PRESSURE: 109 MMHG | HEART RATE: 49 BPM | RESPIRATION RATE: 17 BRPM | OXYGEN SATURATION: 97 % | DIASTOLIC BLOOD PRESSURE: 65 MMHG | TEMPERATURE: 97.8 F | HEIGHT: 66 IN | WEIGHT: 140 LBS | BODY MASS INDEX: 22.5 KG/M2

## 2021-07-05 DIAGNOSIS — R10.9 ABDOMINAL PAIN, UNSPECIFIED ABDOMINAL LOCATION: Primary | ICD-10-CM

## 2021-07-05 DIAGNOSIS — M43.06 LUMBAR PARS DEFECT: ICD-10-CM

## 2021-07-05 DIAGNOSIS — N50.811 PAIN IN RIGHT TESTICLE: ICD-10-CM

## 2021-07-05 LAB
ALBUMIN SERPL-MCNC: 3.9 GM/DL (ref 3.4–5)
ALP BLD-CCNC: 122 IU/L (ref 40–128)
ALT SERPL-CCNC: 87 U/L (ref 10–40)
ANION GAP SERPL CALCULATED.3IONS-SCNC: 10 MMOL/L (ref 4–16)
AST SERPL-CCNC: 52 IU/L (ref 15–37)
BACTERIA: ABNORMAL /HPF
BASOPHILS ABSOLUTE: 0.1 K/CU MM
BASOPHILS RELATIVE PERCENT: 0.4 % (ref 0–1)
BILIRUB SERPL-MCNC: 0.2 MG/DL (ref 0–1)
BILIRUBIN URINE: NEGATIVE MG/DL
BLOOD, URINE: NEGATIVE
BUN BLDV-MCNC: 15 MG/DL (ref 6–23)
CALCIUM SERPL-MCNC: 9.6 MG/DL (ref 8.3–10.6)
CHLORIDE BLD-SCNC: 98 MMOL/L (ref 99–110)
CLARITY: CLEAR
CO2: 28 MMOL/L (ref 21–32)
COLOR: ABNORMAL
CREAT SERPL-MCNC: 0.6 MG/DL (ref 0.9–1.3)
DIFFERENTIAL TYPE: ABNORMAL
EOSINOPHILS ABSOLUTE: 0.1 K/CU MM
EOSINOPHILS RELATIVE PERCENT: 0.4 % (ref 0–3)
GFR AFRICAN AMERICAN: >60 ML/MIN/1.73M2
GFR NON-AFRICAN AMERICAN: >60 ML/MIN/1.73M2
GLUCOSE BLD-MCNC: 101 MG/DL (ref 70–99)
GLUCOSE, URINE: NEGATIVE MG/DL
HCT VFR BLD CALC: 37 % (ref 42–52)
HEMOGLOBIN: 11.6 GM/DL (ref 13.5–18)
IMMATURE NEUTROPHIL %: 0.4 % (ref 0–0.43)
KETONES, URINE: NEGATIVE MG/DL
LEUKOCYTE ESTERASE, URINE: NEGATIVE
LIPASE: 14 IU/L (ref 13–60)
LYMPHOCYTES ABSOLUTE: 1.4 K/CU MM
LYMPHOCYTES RELATIVE PERCENT: 10.1 % (ref 24–44)
MCH RBC QN AUTO: 25.8 PG (ref 27–31)
MCHC RBC AUTO-ENTMCNC: 31.4 % (ref 32–36)
MCV RBC AUTO: 82.4 FL (ref 78–100)
MONOCYTES ABSOLUTE: 0.6 K/CU MM
MONOCYTES RELATIVE PERCENT: 4.1 % (ref 0–4)
NITRITE URINE, QUANTITATIVE: NEGATIVE
NUCLEATED RBC %: 0 %
PDW BLD-RTO: 14.7 % (ref 11.7–14.9)
PH, URINE: 7 (ref 5–8)
PLATELET # BLD: 304 K/CU MM (ref 140–440)
PMV BLD AUTO: 9.9 FL (ref 7.5–11.1)
POTASSIUM SERPL-SCNC: 4 MMOL/L (ref 3.5–5.1)
PROTEIN UA: NEGATIVE MG/DL
RBC # BLD: 4.49 M/CU MM (ref 4.6–6.2)
RBC URINE: ABNORMAL /HPF (ref 0–3)
SEGMENTED NEUTROPHILS ABSOLUTE COUNT: 11.5 K/CU MM
SEGMENTED NEUTROPHILS RELATIVE PERCENT: 84.6 % (ref 36–66)
SODIUM BLD-SCNC: 136 MMOL/L (ref 135–145)
SPECIFIC GRAVITY UA: 1.01 (ref 1–1.03)
TOTAL IMMATURE NEUTOROPHIL: 0.06 K/CU MM
TOTAL NUCLEATED RBC: 0 K/CU MM
TOTAL PROTEIN: 7.9 GM/DL (ref 6.4–8.2)
TRICHOMONAS: ABNORMAL /HPF
UROBILINOGEN, URINE: NEGATIVE MG/DL (ref 0.2–1)
WBC # BLD: 13.6 K/CU MM (ref 4–10.5)
WBC UA: <1 /HPF (ref 0–2)

## 2021-07-05 PROCEDURE — 85025 COMPLETE CBC W/AUTO DIFF WBC: CPT

## 2021-07-05 PROCEDURE — 93975 VASCULAR STUDY: CPT

## 2021-07-05 PROCEDURE — 83690 ASSAY OF LIPASE: CPT

## 2021-07-05 PROCEDURE — 80053 COMPREHEN METABOLIC PANEL: CPT

## 2021-07-05 PROCEDURE — 6360000004 HC RX CONTRAST MEDICATION: Performed by: PHYSICIAN ASSISTANT

## 2021-07-05 PROCEDURE — 96374 THER/PROPH/DIAG INJ IV PUSH: CPT

## 2021-07-05 PROCEDURE — 96375 TX/PRO/DX INJ NEW DRUG ADDON: CPT

## 2021-07-05 PROCEDURE — 99284 EMERGENCY DEPT VISIT MOD MDM: CPT

## 2021-07-05 PROCEDURE — 81001 URINALYSIS AUTO W/SCOPE: CPT

## 2021-07-05 PROCEDURE — 6360000002 HC RX W HCPCS: Performed by: PHYSICIAN ASSISTANT

## 2021-07-05 PROCEDURE — 76870 US EXAM SCROTUM: CPT

## 2021-07-05 PROCEDURE — 74177 CT ABD & PELVIS W/CONTRAST: CPT

## 2021-07-05 RX ORDER — SODIUM CHLORIDE 0.9 % (FLUSH) 0.9 %
5-40 SYRINGE (ML) INJECTION 2 TIMES DAILY
Status: DISCONTINUED | OUTPATIENT
Start: 2021-07-05 | End: 2021-07-05 | Stop reason: HOSPADM

## 2021-07-05 RX ORDER — ONDANSETRON 2 MG/ML
4 INJECTION INTRAMUSCULAR; INTRAVENOUS ONCE
Status: COMPLETED | OUTPATIENT
Start: 2021-07-05 | End: 2021-07-05

## 2021-07-05 RX ORDER — MORPHINE SULFATE 4 MG/ML
4 INJECTION, SOLUTION INTRAMUSCULAR; INTRAVENOUS ONCE
Status: COMPLETED | OUTPATIENT
Start: 2021-07-05 | End: 2021-07-05

## 2021-07-05 RX ADMIN — IOPAMIDOL 75 ML: 755 INJECTION, SOLUTION INTRAVENOUS at 05:00

## 2021-07-05 RX ADMIN — MORPHINE SULFATE 4 MG: 4 INJECTION, SOLUTION INTRAMUSCULAR; INTRAVENOUS at 03:55

## 2021-07-05 RX ADMIN — ONDANSETRON 4 MG: 2 INJECTION INTRAMUSCULAR; INTRAVENOUS at 03:55

## 2021-07-05 ASSESSMENT — PAIN SCALES - GENERAL
PAINLEVEL_OUTOF10: 8
PAINLEVEL_OUTOF10: 8

## 2021-07-05 ASSESSMENT — PAIN DESCRIPTION - ORIENTATION: ORIENTATION: RIGHT;LOWER

## 2021-07-05 ASSESSMENT — PAIN DESCRIPTION - LOCATION: LOCATION: ABDOMEN

## 2021-07-05 ASSESSMENT — PAIN DESCRIPTION - PAIN TYPE: TYPE: ACUTE PAIN

## 2021-07-05 NOTE — ED NOTES
Bed: ED-19  Expected date:   Expected time:   Means of arrival:   Comments:  H2     Dragan Ortiz RN  07/05/21 0615

## 2021-07-05 NOTE — ED PROVIDER NOTES
EMERGENCY DEPARTMENT ENCOUNTER      PCP: No primary care provider on file. CHIEF COMPLAINT    Chief Complaint   Patient presents with    Abdominal Pain     RLQ       Of note, this patient was also evaluated by the attending physician, Dr. Nato Sanite. HPI    Gregoria Kennedy is a 45 y.o. male who presents with abdominal pain. Onset - 4 days ago  Location -RLQ  Duration - constant, worsening  Character - sharp, shooting  Aggravating/Alleviating factors -aggravating factor is palpation of the affected area as well as certain movements. Alleviating factors are denied. Patient has not tried medication for symptoms. Associate symptoms -subjective fever and chills  Radiation -radiates to right testicle  Severity - 8/10    Patient reports he was seen in the ED a few days ago for the same symptoms but at that time he reports that his right lower quadrant abdominal pain was very mild and that he  mostly came to the ED hoping to be admitted to detox from heroin. Patient reports once he found out that her hospital did not have a area for detox from heroin and he decided to leave [address] as he was having only very mild right lower quadrant abdominal pain. He reports that since leaving the pain is worsened have been constant. He is concerned for appendicitis. Patient denies nausea, vomiting, diarrhea, left testicular pain, penile pain, urinary symptoms, constipation, melena, hematochezia, hematemesis. REVIEW OF SYSTEMS    Constitutional:  + Subjective fever, chills  HENT:  Denies sore throat or ear pain   Cardiovascular:  Denies chest pain, palpitations or swelling   Respiratory:  Denies cough or shortness of breath   GI:  See HPI above  : + Right testicular pain; no hematuria, urinary urgency, urinary frequency, dysuria. Denies left testicular pain, penile pain. Musculoskeletal:  Denies back pain or groin pain or masses. No pain or swelling of extremities.   Skin:  Denies rash  Neurologic: Denies headache, focal weakness or sensory changes   Endocrine:  Denies polyuria or polydypsia   Lymphatic:  Denies swollen glands     All other review of systems are negative  See HPI and nursing notes for additional information     PAST MEDICAL & SURGICAL HISTORY    Past Medical History:   Diagnosis Date    Cardiac arrest [facility])     states cardiac arrest in 18 yrs ago    Seizures ([facility] [address]      Past Surgical History:   Procedure Laterality Date    CARDIAC CATHETERIZATION      FINGER AMPUTATION      right first finger       CURRENT MEDICATIONS    Current Outpatient Rx   Medication Sig Dispense Refill    levETIRAcetam (KEPPRA) 500 MG tablet Take 1 tablet by mouth 2 times daily 60 tablet 0    naproxen (NAPROSYN) 500 MG tablet Take 1 tablet by mouth 2 times daily 15 tablet 0    Magic Mouthwash (MIRACLE MOUTHWASH) Swish and spit 5 mLs 4 times daily as needed for Dental Pain Equal parts mixture benadryl, maalox, and lidocaine.  240 mL 0       ALLERGIES    Allergies   Allergen Reactions    Entex La     Erythromycin      seizure    Azithromycin Palpitations    Codeine Nausea And Vomiting    Nubain [Nalbuphine Hcl] Nausea And Vomiting       SOCIAL AND FAMILY HISTORY    Social History     Socioeconomic History    Marital status:      Spouse name: None    Number of children: None    Years of education: None    Highest education level: None   Occupational History    None   Tobacco Use    Smoking status: Current Every Day Smoker     Packs/day: 1.00     Types: Cigarettes    Smokeless tobacco: Never Used   Substance and Sexual Activity    Alcohol use: No    Drug use: Yes     Types: Cocaine, Marijuana, IV, Opiates      Comment: last used heroin 26 hrs ago    Sexual activity: Yes     Partners: Female   Other Topics Concern    None   Social History Narrative    None     Social Determinants of Health     Financial Resource Strain:     Difficulty of Paying Living Expenses:    Food Insecurity:     Worried About Running Out of Food in the Last Year:     Castro of Food in the Last Year:    Transportation Needs:     Lack of Transportation (Medical):  Lack of Transportation (Non-Medical):    Physical Activity:     Days of Exercise per Week:     Minutes of Exercise per Session:    Stress:     Feeling of Stress :    Social Connections:     Frequency of Communication with Friends and Family:     Frequency of Social Gatherings with Friends and Family:     Attends Adventism Services:     Active Member of Clubs or Organizations:     Attends Club or Organization Meetings:     Marital Status:    Intimate Partner Violence:     Fear of Current or Ex-Partner:     Emotionally Abused:     Physically Abused:     Sexually Abused:      History reviewed. No pertinent family history. PHYSICAL EXAM    VITAL SIGNS: BP (!) 144/98   Pulse 63   Temp 97.8 °F (36.6 °C)   Resp 18   Ht 5' 6\" (1.676 m)   Wt 140 lb (63.5 kg)   SpO2 97%   BMI 22.60 kg/m²   Constitutional:  Well developed, well nourished. No distress  Eyes:  Sclera nonicteric, conjunctiva moist  HENT:  Atraumatic. PERRL. EOMI. Moist mucus membranes. Neck/Lymphatics: supple, no JVD, no swollen nodes  Respiratory:  No retractions, no accessory muscle use, normal breath sounds   Cardiovascular:  normal rate, normal rhythm, no murmurs    GI:    No gross discoloration.       -no Mallie's sign (periumbilical ecchymosis)       -no Grey-Corbett's sign (flank ecchymosis)    Bowel sounds present, no audible bruits. Soft, no distention, no guarding, no rigidity,   + RLQ abdominal tenderness to palpation, no rebound tenderness, no palpable pulsatile masses,   No increased tenderness palpation over McBurney's point   Negative Rovsing sign    Negative Teixeira's sign.  exam: See supervising physician's note for the examination    Back:  No CVA tenderness to percussion.   Musculoskeletal:  No edema, no deformity  Vascular: DP pulses 2+ equal Bilirubin Urine NEGATIVE NEGATIVE MG/DL    Ketones, Urine NEGATIVE NEGATIVE MG/DL    Specific Gravity, UA 1.014 1.001 - 1.035    Blood, Urine NEGATIVE NEGATIVE    pH, Urine 7.0 5.0 - 8.0    Protein, UA NEGATIVE NEGATIVE MG/DL    Urobilinogen, Urine NEGATIVE 0.2 - 1.0 MG/DL    Nitrite Urine, Quantitative NEGATIVE NEGATIVE    Leukocyte Esterase, Urine NEGATIVE NEGATIVE    RBC, UA NONE SEEN 0 - 3 /HPF    WBC, UA <1 0 - 2 /HPF    Bacteria, UA RARE (A) NEGATIVE /HPF    Trichomonas, UA NONE SEEN NONE SEEN /HPF           RADIOLOGY/PROCEDURES    CT ABDOMEN PELVIS W IV CONTRAST Additional Contrast? None   Preliminary Result   1. Normal appendix. No acute abnormality in the abdomen or pelvis. 2. Distended gallbladder without changes of acute cholecystitis. No biliary   dilation. No calcified gallstone. 3. Chronic bilateral L5 pars defects. Grade 1 anterolisthesis of L5. Severe   L5-S1 degenerative change. US SCROTUM AND TESTICLES   Final Result   Normal testicular ultrasound with normal Doppler flow. US DUP ABD PEL RETRO SCROT COMPLETE   Final Result   Normal testicular ultrasound with normal Doppler flow. ED COURSE & MEDICAL DECISION MAKING       Vital signs and nursing notes reviewed during ED course. Patient care and presentation staffed with and seen in conjunction with supervising physician, Dr. Veronica Griggs, today. Patient presents as above which prompted workup. Recent ED visit reviewed in chart today. While in the ED today, labs and imaging were obtained. Labs reveal mild leukocytosis of 13.6, anemia with hemoglobin of 11.6, mildly elevated ALT of 87 and elevated AST of 52. Lipase within normal limits- low clinical suspicion for pancreatitis. Urinalysis without evidence of infection. Initial abdominal exam and repeat abdominal exam are without peritoneal signs. CT abdomen pelvis obtained today to rule out acute appendicitis.   CT reveals normal appendix and no acute abnormality in the abdomen or pelvis. There is distended gallbladder without changes of acute cholecystitis and no biliary dilation or calcified gallstone present. There are chronic bilateral L5 pars defect with grade 1 anterolisthesis of L5 and severe L5-S1 degenerative change. Patient has no back pain at this time and is therefore asymptomatic for his pars defect at this time. Ultrasound of scrotum and testicles obtained today to rule out testicular torsion given radiation of pain to right testicle. Ultrasound reveals normal testicular ultrasound with normal Doppler flow per radiologist read. No evidence of torsion. .  Patient treated in the ED with IV morphine and IV Zofran with improvement in pain. Discussed possible musculoskeletal etiology of symptoms versus viral etiology. Patient is nontoxic appearing. Vital signs stable. Patient stable for outpatient management and comfortable with discharge at this time. Recommended ibuprofen and Tylenol as needed for discomfort. All pertinent Lab data and radiographic results reviewed with patient at bedside. The patient and/or the family were informed of the results of any tests/labs/imaging, the treatment plan, and time was allotted to answer questions. Diagnosis, disposition, and treatment plan reviewed in detail with patient. Patient understands and agrees to follow-up with [facility] for recheck in 2 days. PCP referral provided today. Patient understands and agrees to return to emergency department for any new or worsening symptoms - strict return precautions given. Clinical  IMPRESSION    1. Abdominal pain, unspecified abdominal location    2. Pain in right testicle    3.  Lumbar pars defect        Disposition referral (if applicable):  [facility]  [address]  [facility] [address]  Call today  Recheck in 2 days    Edyta Barnett MD  [address] [facility] [address]  [phone]    Call   Establish primary care physician    [facility] Emergency Department  [address] [phone]  Go to   Return to ED if symptoms worsen or new symptoms      Disposition medications (if applicable):  New Prescriptions    No medications on file         Comment: Please note this report has been produced using speech recognition software and may contain errors related to that system including errors in grammar, punctuation, and spelling, as well as words and phrases that may be inappropriate. If there are any questions or concerns please feel free to contact the dictating provider for clarification.         Arsenio Paget, PA-C  07/05/21 5774

## 2021-07-05 NOTE — ED PROVIDER NOTES
I independently examined and evaluated Nicole Hayes. In brief their history revealed a 45 y.o. male who presents with abdominal pain. Onset - 4 days ago  Location -RLQ  Duration - constant, worsening  Character - sharp, shooting  Aggravating/Alleviating factors -aggravating factor is palpation of the affected area as well as certain movements. Alleviating factors are denied. Patient has not tried medication for symptoms. Associate symptoms -subjective fever and chills  Radiation -radiates to right testicle  Severity - 8/10     Patient reports he was seen in the ED a few days ago for the same symptoms but at that time he reports that his right lower quadrant abdominal pain was very mild and that he  mostly came to the ED hoping to be admitted to detox from heroin. Patient reports once he found out that her hospital did not have a area for detox from heroin and he decided to leave [address] as he was having only very mild right lower quadrant abdominal pain. He reports that since leaving the pain is worsened have been constant. He is concerned for appendicitis.     Patient denies nausea, vomiting, diarrhea, left testicular pain, penile pain, urinary symptoms, constipation, melena, hematochezia, hematemesis. Their focused exam revealed alert and oriented male resting in bed no distress normocephalic atraumatic sclera clear airway normal lungs clear heart regular rate and rhythm 2+ pulses throughout abdomen soft mildly tender right lower quadrant no hernia no pulsatile mass no rebound guarding rigidity. Bowel sounds present normal.  5-5 strength throughout skin has no rash or swelling cranial nerves intact. Does have right testicle pain, no swelling, no erythema. ED course: Patient seen with PA please see her note. Patient here with right lower quadrant pain constant pain for the past 3 days. He was seen here recently and discharged home he is a known IV drug abuser.   On arrival he does have a white count of 13 he has focal right lower quadrant pain there is no obvious hernia or pulsatile mass or signs of trauma will get labs, imaging including CT stone study. We will also get testicle ultrasound. CT scan shows distended gallbladder but no signs of cholecystitis. All his pain is right lower quadrant there is no hernia no kidney stone he has a normal appendix. Ultrasound of testes is negative, also urine is negative. Patient stable for discharge given return precautions follow-up information. Likely strain, or viral in nature will discharge home give return precautions and follow-up information. Stable. All diagnostic, treatment, and disposition decisions were made by myself in conjunction with the Advanced Practice Provider. For all further details of the patient's emergency department visit, please see the Advanced Practice Provider's documentation.         Alonso Yanez DO  07/05/21 1669

## 2022-01-10 ENCOUNTER — HOSPITAL ENCOUNTER (EMERGENCY)
Age: 52
Discharge: HOME | End: 2022-01-10
Attending: EMERGENCY MEDICINE
Payer: MEDICAID

## 2022-01-10 ENCOUNTER — APPOINTMENT (OUTPATIENT)
Dept: GENERAL RADIOLOGY | Age: 52
End: 2022-01-10
Payer: MEDICAID

## 2022-01-10 ENCOUNTER — APPOINTMENT (OUTPATIENT)
Dept: CT IMAGING | Age: 52
End: 2022-01-10
Payer: MEDICAID

## 2022-01-10 VITALS
OXYGEN SATURATION: 98 % | BODY MASS INDEX: 22.5 KG/M2 | HEART RATE: 95 BPM | TEMPERATURE: 98.1 F | HEIGHT: 66 IN | WEIGHT: 140 LBS | RESPIRATION RATE: 18 BRPM | SYSTOLIC BLOOD PRESSURE: 120 MMHG | DIASTOLIC BLOOD PRESSURE: 65 MMHG

## 2022-01-10 DIAGNOSIS — R41.0 TRANSIENT CONFUSION: ICD-10-CM

## 2022-01-10 DIAGNOSIS — R56.9 SEIZURE (HCC): Primary | ICD-10-CM

## 2022-01-10 LAB
ALBUMIN SERPL-MCNC: 3.6 GM/DL (ref 3.4–5)
ALCOHOL SCREEN SERUM: <0.01 %WT/VOL
ALP BLD-CCNC: 129 IU/L (ref 40–129)
ALT SERPL-CCNC: 26 U/L (ref 10–40)
AMMONIA: 31 UMOL/L (ref 16–60)
AMPHETAMINES: NEGATIVE
ANION GAP SERPL CALCULATED.3IONS-SCNC: 15 MMOL/L (ref 4–16)
AST SERPL-CCNC: 23 IU/L (ref 15–37)
BACTERIA: ABNORMAL /HPF
BARBITURATE SCREEN URINE: NEGATIVE
BASOPHILS ABSOLUTE: 0 K/CU MM
BASOPHILS RELATIVE PERCENT: 0.2 % (ref 0–1)
BENZODIAZEPINE SCREEN, URINE: NEGATIVE
BILIRUB SERPL-MCNC: 0.2 MG/DL (ref 0–1)
BILIRUBIN URINE: NEGATIVE MG/DL
BLOOD, URINE: ABNORMAL
BUN BLDV-MCNC: 13 MG/DL (ref 6–23)
CALCIUM SERPL-MCNC: 9.6 MG/DL (ref 8.3–10.6)
CANNABINOID SCREEN URINE: NEGATIVE
CHLORIDE BLD-SCNC: 93 MMOL/L (ref 99–110)
CLARITY: CLEAR
CO2: 19 MMOL/L (ref 21–32)
COCAINE METABOLITE: ABNORMAL
COLOR: YELLOW
CREAT SERPL-MCNC: 0.8 MG/DL (ref 0.9–1.3)
DIFFERENTIAL TYPE: ABNORMAL
EOSINOPHILS ABSOLUTE: 0 K/CU MM
EOSINOPHILS RELATIVE PERCENT: 0 % (ref 0–3)
GFR AFRICAN AMERICAN: >60 ML/MIN/1.73M2
GFR NON-AFRICAN AMERICAN: >60 ML/MIN/1.73M2
GLUCOSE BLD-MCNC: 182 MG/DL (ref 70–99)
GLUCOSE BLD-MCNC: 191 MG/DL
GLUCOSE BLD-MCNC: 191 MG/DL (ref 70–99)
GLUCOSE, URINE: NEGATIVE MG/DL
HCT VFR BLD CALC: 41.8 % (ref 42–52)
HEMOGLOBIN: 12.9 GM/DL (ref 13.5–18)
IMMATURE NEUTROPHIL %: 0.7 % (ref 0–0.43)
KETONES, URINE: ABNORMAL MG/DL
LEUKOCYTE ESTERASE, URINE: NEGATIVE
LIPASE: 14 IU/L (ref 13–60)
LYMPHOCYTES ABSOLUTE: 0.5 K/CU MM
LYMPHOCYTES RELATIVE PERCENT: 5.4 % (ref 24–44)
MCH RBC QN AUTO: 24.9 PG (ref 27–31)
MCHC RBC AUTO-ENTMCNC: 30.9 % (ref 32–36)
MCV RBC AUTO: 80.7 FL (ref 78–100)
MONOCYTES ABSOLUTE: 0.3 K/CU MM
MONOCYTES RELATIVE PERCENT: 3.1 % (ref 0–4)
MUCUS: ABNORMAL HPF
NITRITE URINE, QUANTITATIVE: NEGATIVE
NUCLEATED RBC %: 0 %
OPIATES, URINE: ABNORMAL
OXYCODONE: NEGATIVE
PDW BLD-RTO: 15.2 % (ref 11.7–14.9)
PH, URINE: 5.5 (ref 5–8)
PHENCYCLIDINE, URINE: NEGATIVE
PLATELET # BLD: 346 K/CU MM (ref 140–440)
PMV BLD AUTO: 9.6 FL (ref 7.5–11.1)
POTASSIUM SERPL-SCNC: 3.8 MMOL/L (ref 3.5–5.1)
PROTEIN UA: 30 MG/DL
RBC # BLD: 5.18 M/CU MM (ref 4.6–6.2)
RBC URINE: 9 /HPF (ref 0–3)
SEGMENTED NEUTROPHILS ABSOLUTE COUNT: 8 K/CU MM
SEGMENTED NEUTROPHILS RELATIVE PERCENT: 90.6 % (ref 36–66)
SODIUM BLD-SCNC: 127 MMOL/L (ref 135–145)
SPECIFIC GRAVITY UA: 1.03 (ref 1–1.03)
SPERM: ABNORMAL /HFP
TOTAL IMMATURE NEUTOROPHIL: 0.06 K/CU MM
TOTAL NUCLEATED RBC: 0 K/CU MM
TOTAL PROTEIN: 7.8 GM/DL (ref 6.4–8.2)
UROBILINOGEN, URINE: NORMAL MG/DL (ref 0.2–1)
WBC # BLD: 8.8 K/CU MM (ref 4–10.5)
WBC UA: 6 /HPF (ref 0–2)
YEAST: ABNORMAL /HPF

## 2022-01-10 PROCEDURE — 82140 ASSAY OF AMMONIA: CPT

## 2022-01-10 PROCEDURE — G0480 DRUG TEST DEF 1-7 CLASSES: HCPCS

## 2022-01-10 PROCEDURE — 80053 COMPREHEN METABOLIC PANEL: CPT

## 2022-01-10 PROCEDURE — 96374 THER/PROPH/DIAG INJ IV PUSH: CPT

## 2022-01-10 PROCEDURE — 99285 EMERGENCY DEPT VISIT HI MDM: CPT

## 2022-01-10 PROCEDURE — 70450 CT HEAD/BRAIN W/O DYE: CPT

## 2022-01-10 PROCEDURE — 6360000002 HC RX W HCPCS: Performed by: EMERGENCY MEDICINE

## 2022-01-10 PROCEDURE — 2580000003 HC RX 258: Performed by: PHYSICIAN ASSISTANT

## 2022-01-10 PROCEDURE — 2580000003 HC RX 258: Performed by: EMERGENCY MEDICINE

## 2022-01-10 PROCEDURE — 83690 ASSAY OF LIPASE: CPT

## 2022-01-10 PROCEDURE — 71045 X-RAY EXAM CHEST 1 VIEW: CPT

## 2022-01-10 PROCEDURE — 93005 ELECTROCARDIOGRAM TRACING: CPT | Performed by: PHYSICIAN ASSISTANT

## 2022-01-10 PROCEDURE — 82962 GLUCOSE BLOOD TEST: CPT

## 2022-01-10 PROCEDURE — 85025 COMPLETE CBC W/AUTO DIFF WBC: CPT

## 2022-01-10 PROCEDURE — 81001 URINALYSIS AUTO W/SCOPE: CPT

## 2022-01-10 PROCEDURE — 80307 DRUG TEST PRSMV CHEM ANLYZR: CPT

## 2022-01-10 RX ORDER — 0.9 % SODIUM CHLORIDE 0.9 %
1000 INTRAVENOUS SOLUTION INTRAVENOUS ONCE
Status: COMPLETED | OUTPATIENT
Start: 2022-01-10 | End: 2022-01-10

## 2022-01-10 RX ORDER — LEVETIRACETAM 500 MG/1
500 TABLET ORAL 2 TIMES DAILY
Qty: 60 TABLET | Refills: 0 | Status: SHIPPED | OUTPATIENT
Start: 2022-01-10

## 2022-01-10 RX ADMIN — SODIUM CHLORIDE 1000 ML: 9 INJECTION, SOLUTION INTRAVENOUS at 12:10

## 2022-01-10 RX ADMIN — LEVETIRACETAM 1000 MG: 100 INJECTION, SOLUTION, CONCENTRATE INTRAVENOUS at 12:56

## 2022-01-10 NOTE — Clinical Note
Karena Ornelas was seen and treated in our emergency department on 1/10/2022. He may return to work on 01/11/2022. If you have any questions or concerns, please don't hesitate to call.       Venkat العلي MD

## 2022-01-10 NOTE — ED NOTES
Discussed discharge instructions with patient. All questions answered. Patient verbalized understanding.           Edwin Casillas RN  01/10/22 1300

## 2022-01-10 NOTE — ED PROVIDER NOTES
CHIEF COMPLAINT  Chief Complaint   Patient presents with    Seizures       HPI  Maria E Jimenez is a 45 y.o. male with history of substance use, seizures, no antiepileptics in over a year who presents possible breakthrough seizure. Patient states \"I was sitting in the truck waiting for my wife to pay for the gas and when she came out I had gotten out of the truck and was holding a machine from the back of the truck and attempting to use a \". He then went home and was confused transiently. He reports that he has tonic-clonic seizures but there was no tonic-clonic motion witnessed by anyone. No incontinence. Currently he states \"I am already starting to feel better I just feel kind of out of it\". He denies any trauma or falls. REVIEW OF SYSTEMS  Review of Systems   History obtained from chart review and the patient  General ROS: negative for - chills or fever  Ophthalmic ROS: negative for - decreased vision or double vision  ENT ROS: negative for - headaches  Hematological and Lymphatic ROS: negative for - bleeding problems  Endocrine ROS: negative for - unexpected weight changes  Respiratory ROS: no cough, shortness of breath, or wheezing  Cardiovascular ROS: no chest pain or dyspnea on exertion  Gastrointestinal ROS: no abdominal pain, change in bowel habits, or black or bloody stools  Genito-Urinary ROS: no dysuria, trouble voiding, or hematuria  Musculoskeletal ROS: negative for - joint stiffness or joint swelling  Neurological ROS: positive for -history of seizure  negative for - weakness      PAST MEDICAL HISTORY  Past Medical History:   Diagnosis Date    Cardiac arrest [facility])     states cardiac arrest in 18 yrs ago    Seizures ([facility] [address]        FAMILY HISTORY  No family history on file.     SOCIAL HISTORY  Social History     Socioeconomic History    Marital status:      Spouse name: Not on file    Number of children: Not on file    Years of education: Not on file    Highest education level: Not on file   Occupational History    Not on file   Tobacco Use    Smoking status: Current Every Day Smoker     Packs/day: 1.00     Types: Cigarettes    Smokeless tobacco: Never Used   Substance and Sexual Activity    Alcohol use: No    Drug use: Yes     Types: Cocaine, Marijuana (Weed), IV, Opiates      Comment: last used heroin 26 hrs ago    Sexual activity: Yes     Partners: Female   Other Topics Concern    Not on file   Social History Narrative    Not on file     Social Determinants of Health     Financial Resource Strain:     Difficulty of Paying Living Expenses: Not on file   Food Insecurity:     Worried About Running Out of Food in the Last Year: Not on file    Castro of Food in the Last Year: Not on file   Transportation Needs:     Lack of Transportation (Medical): Not on file    Lack of Transportation (Non-Medical):  Not on file   Physical Activity:     Days of Exercise per Week: Not on file    Minutes of Exercise per Session: Not on file   Stress:     Feeling of Stress : Not on file   Social Connections:     Frequency of Communication with Friends and Family: Not on file    Frequency of Social Gatherings with Friends and Family: Not on file    Attends [employer]ist Services: Not on file    Active Member of [address] [place] [address] [employer] or Organizations: Not on file    Attends Club or Organization Meetings: Not on file    Marital Status: Not on file   Intimate Partner Violence:     Fear of Current or Ex-Partner: Not on file    Emotionally Abused: Not on file    Physically Abused: Not on file    Sexually Abused: Not on file   Housing Stability:     Unable to Pay for Housing in the Last Year: Not on file    Number of [place] in the Last Year: Not on file    Unstable Housing in the Last Year: Not on file       SURGICAL HISTORY  Past Surgical History:   Procedure Laterality Date    CARDIAC CATHETERIZATION      FINGER AMPUTATION      right first finger       CURRENT MEDICATIONS  No current facility-administered medications on file prior to encounter. Current Outpatient Medications on File Prior to Encounter   Medication Sig Dispense Refill    naproxen (NAPROSYN) 500 MG tablet Take 1 tablet by mouth 2 times daily 15 tablet 0    Magic Mouthwash (MIRACLE MOUTHWASH) Swish and spit 5 mLs 4 times daily as needed for Dental Pain Equal parts mixture benadryl, maalox, and lidocaine. 240 mL 0         ALLERGIES  Allergies   Allergen Reactions    Entex La     Erythromycin      seizure    Azithromycin Palpitations    Codeine Nausea And Vomiting    Nubain [Nalbuphine Hcl] Nausea And Vomiting       PHYSICAL EXAM  VITAL SIGNS: /65   Pulse 95   Temp 98.1 °F (36.7 °C) (Oral)   Resp 18   Ht 5' 6\" (1.676 m)   Wt 140 lb (63.5 kg)   SpO2 98%   BMI 22.60 kg/m²   Constitutional: Well developed, Well nourished, active, pleasant, resting in bed  HENT: Normocephalic, Atraumatic, Bilateral external ears normal, Oropharynx moist, No oral exudates, Nose normal.   Eyes: PERRL, EOMI, Conjunctiva normal, No discharge. Neck: Normal range of motion, Supple, No stridor. Cardiovascular: Normal heart rate, Normal rhythm, No murmurs, No rubs, No gallops. Thorax & Lungs: Normal breath sounds, No respiratory distress, No wheezing, No chest tenderness. Abdomen: Bowel sounds normal, Soft, No tenderness, no guarding, no rebound, No masses, No pulsatile masses. Skin: Warm, Dry, No erythema, No rash. Extremities: Intact distal pulses, No edema, No tenderness, No cyanosis, No clubbing. Musculoskeletal: Good gross range of motion in all major joints. No major deformities noted. Neurologic: Alert & oriented x 3, Normal gross motor function, Normal gross sensory function, No focal deficits noted.   Cranial nerves II through XII intact, no pronator drift, upper and lower strength symmetric  Psychiatric: Affect normal    EKG  EKG Interpretation    Interpreted by emergency department physician from January 10 1113    Rhythm: normal sinus   Rate: normal  Axis: normal  Ectopy: none  Conduction: normal  ST Segments: no acute change  T Waves: no acute change  Q Waves: none    Clinical Impression: Sinus rhythm with 92, no STEMI or ectopy    Christie Park MD      RADIOLOGY/PROCEDURES/LABS  Last Imaging results   CT HEAD WO CONTRAST   Final Result   1. No acute intracranial abnormality. RECOMMENDATIONS:   Unavailable         XR CHEST PORTABLE   Final Result   1. No active pulmonary disease.              Image reviewed by myself    Labs Reviewed   CBC WITH AUTO DIFFERENTIAL - Abnormal; Notable for the following components:       Result Value    Hemoglobin 12.9 (*)     Hematocrit 41.8 (*)     MCH 24.9 (*)     MCHC 30.9 (*)     RDW 15.2 (*)     Segs Relative 90.6 (*)     Lymphocytes % 5.4 (*)     Immature Neutrophil % 0.7 (*)     All other components within normal limits   COMPREHENSIVE METABOLIC PANEL - Abnormal; Notable for the following components:    Sodium 127 (*)     Chloride 93 (*)     CO2 19 (*)     CREATININE 0.8 (*)     Glucose 182 (*)     All other components within normal limits   URINALYSIS - Abnormal; Notable for the following components:    Ketones, Urine LARGE (*)     Blood, Urine TRACE (*)     Protein, UA 30 (*)     RBC, UA 9 (*)     WBC, UA 6 (*)     Bacteria, UA RARE (*)     Mucus, UA OCCASIONAL (*)     All other components within normal limits   URINE DRUG SCREEN - Abnormal; Notable for the following components:    Cocaine Metabolite UNCONFIRMED POSITIVE (*)     Opiates, Urine UNCONFIRMED POSITIVE (*)     All other components within normal limits   POCT GLUCOSE - Abnormal; Notable for the following components:    POC Glucose 191 (*)     All other components within normal limits   POCT GLUCOSE - Normal   LIPASE   AMMONIA   ETHANOL         Medications   0.9 % sodium chloride bolus (0 mLs IntraVENous Stopped 1/10/22 1310)   levETIRAcetam (KEPPRA) 1,000 mg in sodium chloride 0.9 % 100 mL IVPB (0 mg IntraVENous Stopped 1/10/22 1310)       COURSE & MEDICAL DECISION MAKING  Pertinent Labs & Imaging studies reviewed. (See chart for details)    74-year-old male presents with concern for seizure. The episode he describes does not clearly sound like a seizure as he has a history of tonic-clonic seizure and it appears he had an episode of transient delirium, possibly related to substance use. Here he is neurologically nonfocal, his work-up is reassuring, he is tolerating p.o., he is otherwise active, nontoxic. He will be discharged to follow-up with primary care, strict return precautions put in place. He is agreeable plan of care. We will also refill his Keppra and encouraged him to follow-up with neurology as an outpatient. Initial dose of Keppra load in the department. FINAL IMPRESSION  Problem List Items Addressed This Visit     Seizure ([address] - Primary    Relevant Medications    levETIRAcetam (KEPPRA) 500 MG tablet      Other Visit Diagnoses     Transient confusion          1.    2.   3.    Patient gave me permission to discuss medical history, care, and plan with those present in the room.   Electronically signed by: Nell Reyes MD, 1/10/2022  MD Nell Christensen MD  01/10/22 5367

## 2022-01-10 NOTE — ED TRIAGE NOTES
Patient with seizure that lasted about a minute. Patient stopped taking Keppra about 6-12 months ago. Had another seizure about 3 months ago.

## 2022-01-10 NOTE — ED PROVIDER NOTES
As provider-in-triage, I performed a medical screening history and physical exam on this patient. HISTORY OF PRESENT ILLNESS  Shayna Loza is a 45 y.o. male who presents to the emergency department today via EMS after an episode of altered mental status. Patient verbalized that he thinks that he \"had a seizure\". 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 There is no obvious seizure activities. He did not lose consciousness. There is no bowel or bladder incontinence. Patient does have a history of IVDA, uses daily. Last use was this morning. Denies alcohol abuse. He is alert oriented. He is conversational, he states that he has been on Keppra in the past for epilepsy and has not been taking it for the last several months. .  No recent head trauma. Denies headache. PHYSICAL EXAM  /67   Pulse 98   Temp 98.1 °F (36.7 °C)   Resp 18   Ht 5' 6\" (1.676 m)   Wt 140 lb (63.5 kg)   SpO2 96%   BMI 22.60 kg/m²     On exam, the patient appears in no acute distress. Speech is clear. Breathing is unlabored. Moves all extremities    Comment: Please note this report has been produced using speech recognition software and may contain errors related to that system including errors in grammar, punctuation, and spelling, as well as words and phrases that may be inappropriate. If there are any questions or concerns please feel free to contact the dictating provider for clarification.         Lola Vines [address], PA  01/10/22 1119

## 2022-01-12 LAB
EKG ATRIAL RATE: 92 BPM
EKG DIAGNOSIS: NORMAL
EKG P AXIS: -18 DEGREES
EKG P-R INTERVAL: 154 MS
EKG Q-T INTERVAL: 362 MS
EKG QRS DURATION: 80 MS
EKG QTC CALCULATION (BAZETT): 447 MS
EKG R AXIS: 22 DEGREES
EKG T AXIS: 11 DEGREES
EKG VENTRICULAR RATE: 92 BPM

## 2022-01-12 PROCEDURE — 93010 ELECTROCARDIOGRAM REPORT: CPT | Performed by: INTERNAL MEDICINE

## 2022-02-10 ENCOUNTER — HOSPITAL ENCOUNTER (EMERGENCY)
Age: 52
Discharge: HOME | End: 2022-02-10
Attending: EMERGENCY MEDICINE
Payer: MEDICAID

## 2022-02-10 ENCOUNTER — APPOINTMENT (OUTPATIENT)
Dept: CT IMAGING | Age: 52
End: 2022-02-10
Payer: MEDICAID

## 2022-02-10 VITALS
OXYGEN SATURATION: 97 % | WEIGHT: 162 LBS | RESPIRATION RATE: 16 BRPM | HEART RATE: 96 BPM | TEMPERATURE: 97.8 F | DIASTOLIC BLOOD PRESSURE: 78 MMHG | HEIGHT: 66 IN | BODY MASS INDEX: 26.03 KG/M2 | SYSTOLIC BLOOD PRESSURE: 132 MMHG

## 2022-02-10 DIAGNOSIS — R10.31 ABDOMINAL PAIN, RIGHT LOWER QUADRANT: Primary | ICD-10-CM

## 2022-02-10 LAB
ALBUMIN SERPL-MCNC: 4.1 GM/DL (ref 3.4–5)
ALP BLD-CCNC: 120 IU/L (ref 40–129)
ALT SERPL-CCNC: 35 U/L (ref 10–40)
ANION GAP SERPL CALCULATED.3IONS-SCNC: 10 MMOL/L (ref 4–16)
AST SERPL-CCNC: 23 IU/L (ref 15–37)
BACTERIA: ABNORMAL /HPF
BASOPHILS ABSOLUTE: 0.1 K/CU MM
BASOPHILS RELATIVE PERCENT: 0.8 % (ref 0–1)
BILIRUB SERPL-MCNC: 0.2 MG/DL (ref 0–1)
BILIRUBIN DIRECT: 0.2 MG/DL (ref 0–0.3)
BILIRUBIN URINE: NEGATIVE MG/DL
BILIRUBIN, INDIRECT: 0 MG/DL (ref 0–0.7)
BLOOD, URINE: NEGATIVE
BUN BLDV-MCNC: 19 MG/DL (ref 6–23)
CALCIUM SERPL-MCNC: 9 MG/DL (ref 8.3–10.6)
CAST TYPE: ABNORMAL /HPF
CHLORIDE BLD-SCNC: 100 MMOL/L (ref 99–110)
CLARITY: CLEAR
CO2: 28 MMOL/L (ref 21–32)
COLOR: YELLOW
CREAT SERPL-MCNC: 0.9 MG/DL (ref 0.9–1.3)
CRYSTAL TYPE: ABNORMAL /HPF
DIFFERENTIAL TYPE: ABNORMAL
EOSINOPHILS ABSOLUTE: 0.3 K/CU MM
EOSINOPHILS RELATIVE PERCENT: 4.3 % (ref 0–3)
EPITHELIAL CELLS, UA: 0 /HPF
GFR AFRICAN AMERICAN: >60 ML/MIN/1.73M2
GFR NON-AFRICAN AMERICAN: >60 ML/MIN/1.73M2
GLUCOSE BLD-MCNC: 82 MG/DL (ref 70–99)
GLUCOSE, URINE: NEGATIVE MG/DL
HCT VFR BLD CALC: 41.3 % (ref 42–52)
HEMOGLOBIN: 13.4 GM/DL (ref 13.5–18)
IMMATURE NEUTROPHIL %: 0.4 % (ref 0–0.43)
KETONES, URINE: NEGATIVE MG/DL
LEUKOCYTE ESTERASE, URINE: NEGATIVE
LIPASE: 25 IU/L (ref 13–60)
LYMPHOCYTES ABSOLUTE: 2.8 K/CU MM
LYMPHOCYTES RELATIVE PERCENT: 35.7 % (ref 24–44)
MCH RBC QN AUTO: 25.5 PG (ref 27–31)
MCHC RBC AUTO-ENTMCNC: 32.4 % (ref 32–36)
MCV RBC AUTO: 78.7 FL (ref 78–100)
MONOCYTES ABSOLUTE: 0.7 K/CU MM
MONOCYTES RELATIVE PERCENT: 8.3 % (ref 0–4)
NITRITE URINE, QUANTITATIVE: NEGATIVE
PDW BLD-RTO: 15.3 % (ref 11.7–14.9)
PH, URINE: 6 (ref 5–8)
PLATELET # BLD: 276 K/CU MM (ref 140–440)
PMV BLD AUTO: 9.3 FL (ref 7.5–11.1)
POTASSIUM SERPL-SCNC: 3.9 MMOL/L (ref 3.5–5.1)
PROTEIN UA: NEGATIVE MG/DL
RBC # BLD: 5.25 M/CU MM (ref 4.6–6.2)
RBC URINE: 1 /HPF (ref 0–3)
SEGMENTED NEUTROPHILS ABSOLUTE COUNT: 4 K/CU MM
SEGMENTED NEUTROPHILS RELATIVE PERCENT: 50.5 % (ref 36–66)
SODIUM BLD-SCNC: 138 MMOL/L (ref 135–145)
SPECIFIC GRAVITY UA: 1.02 (ref 1–1.03)
TOTAL IMMATURE NEUTOROPHIL: 0.03 K/CU MM
TOTAL PROTEIN: 7 GM/DL (ref 6.4–8.2)
UROBILINOGEN, URINE: 0.2 MG/DL (ref 0.2–1)
WBC # BLD: 7.8 K/CU MM (ref 4–10.5)
WBC UA: <0 /HPF (ref 0–2)

## 2022-02-10 PROCEDURE — 6360000004 HC RX CONTRAST MEDICATION: Performed by: EMERGENCY MEDICINE

## 2022-02-10 PROCEDURE — 2580000003 HC RX 258: Performed by: EMERGENCY MEDICINE

## 2022-02-10 PROCEDURE — 85025 COMPLETE CBC W/AUTO DIFF WBC: CPT

## 2022-02-10 PROCEDURE — 80053 COMPREHEN METABOLIC PANEL: CPT

## 2022-02-10 PROCEDURE — 99283 EMERGENCY DEPT VISIT LOW MDM: CPT

## 2022-02-10 PROCEDURE — 83690 ASSAY OF LIPASE: CPT

## 2022-02-10 PROCEDURE — 74177 CT ABD & PELVIS W/CONTRAST: CPT

## 2022-02-10 PROCEDURE — 81001 URINALYSIS AUTO W/SCOPE: CPT

## 2022-02-10 PROCEDURE — 82248 BILIRUBIN DIRECT: CPT

## 2022-02-10 RX ORDER — SODIUM CHLORIDE 0.9 % (FLUSH) 0.9 %
10 SYRINGE (ML) INJECTION PRN
Status: DISCONTINUED | OUTPATIENT
Start: 2022-02-10 | End: 2022-02-10 | Stop reason: HOSPADM

## 2022-02-10 RX ADMIN — IOPAMIDOL 75 ML: 755 INJECTION, SOLUTION INTRAVENOUS at 19:49

## 2022-02-10 RX ADMIN — SODIUM CHLORIDE, PRESERVATIVE FREE 10 ML: 5 INJECTION INTRAVENOUS at 19:50

## 2022-02-10 ASSESSMENT — PAIN SCALES - GENERAL: PAINLEVEL_OUTOF10: 7

## 2022-02-10 NOTE — ED PROVIDER NOTES
Emergency Department Encounter    Patient: Sejal Bowles  MRN: [id number]  : 1970  Date of Evaluation: 2022  ED Provider:  Humberto Shah MD    Triage Chief Complaint:   Abdominal Pain    [place]:  Sejal Bowles is a 45 y.o. male that presents complaining of 1.5-month history of pain in the right inguinal region and right lower quadrant of the abdomen. Patient states that he has had this evaluated previously at the onset of symptoms with testicular ultrasound, CT abdomen pelvis, laboratory evaluation with no discernible etiology to his discomfort. Patient is currently in rehab for opioid use and the physician at the rehab facility felt that the patient should be reevaluated. Patient's pain waxes and wanes and seems to be worse in the morning. Patient denies any dysuria, hematuria, urinary frequency, urgency. Patient has had softer stools but no ayla diarrhea, hematochezia, melena. No nausea or vomiting. No recent worsening of pain. No fever. No known alleviating or exacerbating factors. ROS - see HPI, below listed is current ROS at time of my eval:  General:  No fevers, no chills, no weakness  Eyes:  no discharge  ENT:  No sore throat, no nasal congestion  Cardiovascular:  No chest pain, no palpitations  Respiratory:  No shortness of breath, no cough, no wheezing  Gastrointestinal:  + pain, no nausea, no vomiting, no diarrhea  Musculoskeletal:  No muscle pain, no joint pain  Skin:  No rash, no pruritis  Neurologic:  no headache  Genitourinary:  No dysuria, no hematuria  Endocrine:  No unexpected weight gain, no unexpected weight loss  Extremities:  no edema, no pain    Past Medical History:   Diagnosis Date    Cardiac arrest ([facility] [address]     states cardiac arrest in 18 yrs ago    Seizures ([facility] [address]      Past Surgical History:   Procedure Laterality Date    CARDIAC CATHETERIZATION      FINGER AMPUTATION      right first finger     History reviewed. No pertinent family history.   Social History Socioeconomic History    Marital status:      Spouse name: Not on file    Number of children: Not on file    Years of education: Not on file    Highest education level: Not on file   Occupational History    Not on file   Tobacco Use    Smoking status: Current Every Day Smoker     Packs/day: 1.00     Types: Cigarettes    Smokeless tobacco: Never Used   Substance and Sexual Activity    Alcohol use: No    Drug use: Yes     Types: Cocaine, Marijuana (Abundio Domeduin), IV, Opiates      Comment: last used heroin 26 hrs ago    Sexual activity: Yes     Partners: Female   Other Topics Concern    Not on file   Social History Narrative    Not on file     Social Determinants of Health     Financial Resource Strain:     Difficulty of Paying Living Expenses: Not on file   Food Insecurity:     Worried About Running Out of Food in the Last Year: Not on file    Castro of Food in the Last Year: Not on file   Transportation Needs:     Lack of Transportation (Medical): Not on file    Lack of Transportation (Non-Medical):  Not on file   Physical Activity:     Days of Exercise per Week: Not on file    Minutes of Exercise per Session: Not on file   Stress:     Feeling of Stress : Not on file   Social Connections:     Frequency of Communication with Friends and Family: Not on file    Frequency of Social Gatherings with Friends and Family: Not on file    Attends [employer]ism Services: Not on file    Active Member of [address] [place] [address] or Organizations: Not on file    Attends Club or Organization Meetings: Not on file    Marital Status: Not on file   Intimate Partner Violence:     Fear of Current or Ex-Partner: Not on file    Emotionally Abused: Not on file    Physically Abused: Not on file    Sexually Abused: Not on file   Housing Stability:     Unable to Pay for Housing in the Last Year: Not on file    Number of [place] in the Last Year: Not on file    Unstable Housing in the Last Year: Not on file     No current facility-administered medications for this encounter. Current Outpatient Medications   Medication Sig Dispense Refill    levETIRAcetam (KEPPRA) 500 MG tablet Take 1 tablet by mouth 2 times daily 60 tablet 0    naproxen (NAPROSYN) 500 MG tablet Take 1 tablet by mouth 2 times daily 15 tablet 0    Magic Mouthwash (MIRACLE MOUTHWASH) Swish and spit 5 mLs 4 times daily as needed for Dental Pain Equal parts mixture benadryl, maalox, and lidocaine. 240 mL 0     Allergies   Allergen Reactions    Entex La     Erythromycin      seizure    Azithromycin Palpitations    Codeine Nausea And Vomiting    Nubain [Nalbuphine Hcl] Nausea And Vomiting       Nursing Notes Reviewed    Physical Exam:  Triage VS:    ED Triage Vitals [02/10/22 1564]   Enc Vitals Group      /78      Pulse 96      Resp 16      Temp 97.8 °F (36.6 °C)      Temp Source Infrared      SpO2 97 %      Weight 162 lb (73.5 kg)      Height 5' 6\" (1.676 m)      Head Circumference       Peak Flow       Pain Score       Pain Loc       Pain Edu? Excl. in [address]? My pulse ox interpretation is - normal    General appearance:  No acute distress. Skin:  Warm. Dry. No rash. Neck:  Trachea midline. Heart:  Regular rate and rhythm, normal S1 & S2.    Perfusion:  intact  Respiratory:  Lungs clear to auscultation bilaterally. Respirations nonlabored. Abdominal:  + right lower abdominal tenderness to palpation but to a lesser degree diffuse tenderness palpation throughout the abdomen but worse in the epigastric, right upper and right lower quadrants. No swelling or or significant tenderness to palpation along the inguinal canal. no rebound guarding or rigidity,  no masses, no organomegaly, no pulsatile masses  Testicular exam performed with male nurse chaperone: No inguinal hernias palpated. Bilateral testicles and epididymis without tenderness to palpation. No penile discharge or external lesions.   Extremity:    normal ROM   Neurological: Alert and oriented times 3.       I have reviewed and interpreted all of the currently available lab results from this visit (if applicable):  Results for orders placed or performed during the hospital encounter of 02/10/22   Urinalysis   Result Value Ref Range    Color, UA YELLOW YELLOW    Clarity, UA CLEAR CLEAR    Glucose, Urine NEGATIVE NEGATIVE MG/DL    Bilirubin Urine NEGATIVE NEGATIVE MG/DL    Ketones, Urine NEGATIVE NEGATIVE MG/DL    Specific Gravity, UA 1.025 1.001 - 1.035    Blood, Urine NEGATIVE NEGATIVE    pH, Urine 6.0 5.0 - 8.0    Protein, UA NEGATIVE NEGATIVE MG/DL    Urobilinogen, Urine 0.2 0.2 - 1.0 MG/DL    Nitrite Urine, Quantitative NEGATIVE NEGATIVE    Leukocyte Esterase, Urine NEGATIVE NEGATIVE    RBC, UA 1 0 - 3 /HPF    WBC, UA <0 (L) 0 - 2 /HPF    Epithelial Cells, UA 0 /HPF    Cast Type NO CAST FORMS SEEN NO CAST FORMS SEEN /HPF    Bacteria, UA NONE SEEN (A) NEGATIVE /HPF    Crystal Type NONE SEEN (A) NEGATIVE /HPF   CBC auto diff   Result Value Ref Range    WBC 7.8 4.0 - 10.5 K/CU MM    RBC 5.25 4.6 - 6.2 M/CU MM    Hemoglobin 13.4 (L) 13.5 - 18.0 GM/DL    Hematocrit 41.3 (L) 42 - 52 %    MCV 78.7 78 - 100 FL    MCH 25.5 (L) 27 - 31 PG    MCHC 32.4 32.0 - 36.0 %    RDW 15.3 (H) 11.7 - 14.9 %    Platelets 287 525 - 561 K/CU MM    MPV 9.3 7.5 - 11.1 FL    Differential Type AUTOMATED DIFFERENTIAL     Segs Relative 50.5 36 - 66 %    Lymphocytes % 35.7 24 - 44 %    Monocytes % 8.3 (H) 0 - 4 %    Eosinophils % 4.3 (H) 0 - 3 %    Basophils % 0.8 0 - 1 %    Segs Absolute 4.0 K/CU MM    Lymphocytes Absolute 2.8 K/CU MM    Monocytes Absolute 0.7 K/CU MM    Eosinophils Absolute 0.3 K/CU MM    Basophils Absolute 0.1 K/CU MM    Immature Neutrophil % 0.4 0 - 0.43 %    Total Immature Neutrophil 0.03 K/CU MM   BMP   Result Value Ref Range    Sodium 138 135 - 145 MMOL/L    Potassium 3.9 3.5 - 5.1 MMOL/L    Chloride 100 99 - 110 mMol/L    CO2 28 21 - 32 MMOL/L    Anion Gap 10 4 - 16    BUN 19 6 - 23 MG/DL CREATININE 0.9 0.9 - 1.3 MG/DL    Glucose 82 70 - 99 MG/DL    Calcium 9.0 8.3 - 10.6 MG/DL    GFR Non-African American >60 >60 mL/min/1.73m2    GFR African American >60 >60 mL/min/1.73m2   Hepatic function panel   Result Value Ref Range    Albumin 4.1 3.4 - 5.0 GM/DL    Total Bilirubin 0.2 0.0 - 1.0 MG/DL    Bilirubin, Direct 0.2 0.0 - 0.3 MG/DL    Bilirubin, Indirect 0.0 0 - 0.7 MG/DL    Alkaline Phosphatase 120 40 - 129 IU/L    AST 23 15 - 37 IU/L    ALT 35 10 - 40 U/L    Total Protein 7.0 6.4 - 8.2 GM/DL   Lipase   Result Value Ref Range    Lipase 25 13 - 60 IU/L      Radiographs (if obtained):  Radiologist's Report Reviewed:  No results found. EKG (if obtained): (All EKG's are interpreted by myself in the absence of a cardiologist)      MDM:  Patient here with abdominal pain. I estimate there is LOW risk for an acute emergent intraabdominal process including, but not limited to, acute appendicitis, bowel obstruction, acute cholecystitis, ruptured diverticulitis, incarcerated/strangling hernia,  perforated bowel/ulcer. Workup including laboratory evaluation and CT abdomen pelvis were ordered. Studies are pending. Patient signed out to the oncoming provider at shift change for follow-up dependence that is an appropriate disposition. Clinical Impression:  1.  Abdominal pain, right lower quadrant      Disposition referral (if applicable):  Keyanna Fleming MD  [address] [phone]    Schedule an appointment as soon as possible for a visit       [address]  [phone]  Go to   If symptoms worsen    Disposition medications (if applicable):  Discharge Medication List as of 2/10/2022  8:56 PM        ED Provider Disposition Time  DISPOSITION Decision To Discharge 02/10/2022 08:55:31 PM      Comment: Please note this report has been produced using speech recognition software and may contain errors related to that system including errors in grammar, punctuation, and spelling, as well as words and phrases that may be inappropriate. Efforts were made to edit the dictations.           Prudencio Gibbons MD  02/12/22 7176

## 2022-02-10 NOTE — ED TRIAGE NOTES
Patient ambulates to triage with complaints of Right lower abdominal pain. Patient states this pain sometimes goes into his testicle. Patient states he has been evaluated for this in the past but they found no answers. Patient states that he had another doctor look at it today who recommended coming to the ER to check for abscess.

## 2022-02-11 NOTE — ED NOTES
Pt verbalized understanding of discharge instructions and follow-up care, denies any questions or concerns at this time. No new prescriptions provided; pt encouraged to return to the ED for any new or worsening symptoms.      Wild Bray RN  02/10/22 2100

## 2022-02-11 NOTE — ED PROVIDER NOTES
eMERGENCY dEPARTMENT eNCOUnter    ATTENDING SIGN OUT NOTE    HPI/Physical Exam/Medical Decision Making  Time: 1900  I have received sign out of [facility]  Emergency Department care from Dr. Merna Morris. We discussed the history, physical exam, completed/pending test results (if obtained) and current treatment plan. Please refer to his/her chart for further details. In brief, the patient is a 45 y.o. male who presented to the ED with chronic right lower quadrant abdominal pain. Work-up thus far has been reassuring. Testicular exam was normal per off going physician. He is pending a CT abdomen and pelvis. I am asked to follow this up and to disposition the patient. 20:50  CT abdomen pelvis is negative. Results are discussed with the patient. The etiology of the patient's chronic pain is not clear at this time. I have a low suspicion for acute surgical abdomen. The patient is to follow-up in the next 1 to 2 days. Return precautions are given. Diagnostics:  CT ABDOMEN PELVIS W IV CONTRAST Additional Contrast? None (Preliminary result)  Result time 02/10/22 21:09:25  Preliminary result by Mary Mcmanus MD (02/10/22 21:09:25)                Impression:    No acute abnormality. Narrative:    EXAMINATION:   CT OF THE ABDOMEN AND PELVIS WITH CONTRAST 2/10/2022 7:36 pm     TECHNIQUE:   CT of the abdomen and pelvis was performed with the administration of   intravenous contrast. Multiplanar reformatted images are provided for review. Dose modulation, iterative reconstruction, and/or weight based adjustment of   the mA/kV was utilized to reduce the radiation dose to as low as reasonably   achievable.      COMPARISON:   07/05/2021     HISTORY:   ORDERING SYSTEM PROVIDED HISTORY: months of R lower abd pain   TECHNOLOGIST PROVIDED HISTORY:   Reason for exam:->months of R lower abd pain   Additional Contrast?->None   Decision Support Exception - unselect if not a suspected or confirmed   emergency medical condition->Emergency Medical Condition (MA)   Reason for Exam: right groin pain     FINDINGS:   Lower Chest:  Visualized portion of the lower chest demonstrates no acute   abnormality. Organs: There is no acute abnormality of the liver, pancreas, spleen,   adrenals, or kidneys. GI/Bowel: Bowel caliber is normal.  There is no evidence of active bowel   inflammation. There is no evidence of acute appendicitis. Pelvis: No acute abnormality of the pelvic viscera. Peritoneum/Retroperitoneum: There is no free air or free fluid.  Lymph nodes   are not enlarged. Bones/Soft Tissues: No acute osseous abnormality.  There is grade 1 isthmic   spondylolisthesis of L5 on S1.  There is associated severe right and moderate   left foraminal stenosis.  Retrolisthesis of L1 on L2 with disc bulge causing   mild bilateral foraminal stenosis.  No acute osseous abnormality.                 Preliminary result by Romelia Darnell MD (02/10/22 20:51:11)                Impression:    No acute abnormality. Labs Reviewed   URINALYSIS - Abnormal; Notable for the following components:       Result Value    WBC, UA <0 (*)     Bacteria, UA NONE SEEN (*)     Crystal Type NONE SEEN (*)     All other components within normal limits   CBC WITH AUTO DIFFERENTIAL - Abnormal; Notable for the following components:    Hemoglobin 13.4 (*)     Hematocrit 41.3 (*)     MCH 25.5 (*)     RDW 15.3 (*)     Monocytes % 8.3 (*)     Eosinophils % 4.3 (*)     All other components within normal limits   BASIC METABOLIC PANEL   HEPATIC FUNCTION PANEL   LIPASE       Clinical Impression:  1.  Abdominal pain, right lower quadrant        Disposition referral (if applicable):  Cheri Foote MD  [address] [phone]    Schedule an appointment as soon as possible for a visit       [address]  [phone]  Go to   If symptoms worsen      Disposition medications (if applicable):  Discharge Medication List as of 2/10/2022  8:56 PM            Comment: Please note this report has been produced using speech recognition software and may contain errors related to that system including errors in grammar, punctuation, and spelling, as well as words and phrases that may be inappropriate. If there are any questions or concerns please feel free to contact the dictating provider for clarification.         Joseph Mirza MD  02/12/22 [id number]

## 2022-03-01 ENCOUNTER — HOSPITAL ENCOUNTER (OUTPATIENT)
Dept: PSYCHIATRY | Age: 52
Setting detail: THERAPIES SERIES
Discharge: HOME | End: 2022-03-01
Payer: MEDICAID

## 2022-03-01 PROCEDURE — 90791 PSYCH DIAGNOSTIC EVALUATION: CPT

## 2022-03-01 PROCEDURE — 80305 DRUG TEST PRSMV DIR OPT OBS: CPT

## 2022-03-01 ASSESSMENT — ANXIETY QUESTIONNAIRES
6. BECOMING EASILY ANNOYED OR IRRITABLE: 2
IF YOU CHECKED OFF ANY PROBLEMS ON THIS QUESTIONNAIRE, HOW DIFFICULT HAVE THESE PROBLEMS MADE IT FOR YOU TO DO YOUR WORK, TAKE CARE OF THINGS AT HOME, OR GET ALONG WITH OTHER PEOPLE: SOMEWHAT DIFFICULT
3. WORRYING TOO MUCH ABOUT DIFFERENT THINGS: 2
4. TROUBLE RELAXING: 1
7. FEELING AFRAID AS IF SOMETHING AWFUL MIGHT HAPPEN: 1
5. BEING SO RESTLESS THAT IT IS HARD TO SIT STILL: 1
2. NOT BEING ABLE TO STOP OR CONTROL WORRYING: 1

## 2022-03-01 ASSESSMENT — LIFESTYLE VARIABLES: HISTORY_ALCOHOL_USE: YES

## 2022-03-01 NOTE — PROGRESS NOTES
 CARDIAC CATHETERIZATION      FINGER AMPUTATION      right first finger     Past Medical History:   Diagnosis Date    Cardiac arrest [facility])     states cardiac arrest in 18 yrs ago    Seizures [facility])      Patient Active Problem List    Diagnosis Date Noted    Abscess 09/03/2019    Cellulitis 09/02/2019    Seizure ([facility] [address] 08/28/2014       Corinna July, [address]  3/1/2022/2:55 PM

## 2022-03-01 NOTE — PROGRESS NOTES
[facility] TREATMENT PLAN      Location: [x] [place] [] Chantelle Patel    Treatment plan: Initial    Strengths: hard worker    Weakness/Limitations: impatience     Service/Frequency/Duration: IOP 3 days a week for 90 days , Urinalysis Random for 90 days, AA/NA 1-2 meetings weekly for 90 days and Case Management as needed for 90 days  Individual sessions 1-3 monthly for 90 days    Diagnosis: F10.20 Other and unspecified alcohol dependence/unspecified drinking behavior, F11.20 Opioid dependence-unspecified use, F14.20 Cocaine dependence-unspecified use and F12.10 Nondependent cannabis abuse-unspecified use    Level of Care: 2.1 Intensive Outpatient Services    1. Problem: Substance use resulting in inpatient detox and 45 days at [facility]. a. Goal: Abstain from using drugs/alcohol  in 90 Days   b. Objectives:   i. 1) Id 3 Indicators of Addiction in 90 Days. Evaluation Date: 6/1/2022 Code: C Continue TBD   ii. 2) I'd 3 Negative Effects of Substance Use in 90 Days. Evaluation Date: 6/1/2022Code: C Continue TBD                            iii  3) I'd 3 benefits of maintaining sobriety  could positively impact your                                     in 90 days  Evaluation Date: 6/1/2022 Code: C continue TBD    2. Problem: Lacks Coping Skills to Maintain Long-term Sobriety   a. Goal: Acquire necessary skills to maintain sobriety in 90 Days   b. Objectives:   i. 1) I'd 3 Changes that you will make that support your recovery in 90 Days Evaluation Date: 6/1/2022 Code: C Continue TBD   ii. 2) I'd 3 External/Internal Triggers and Sober Responses to them in 90 Days Evaluation Date: 6/1/2022  Code: C Continue TBD   iii. 3) 1x Month contact with [facility]  for support in 90 Days Evaluation Date: 6/1/2022  Code: C Continue TBD     3.     Problem: Lacks Understanding and Knowledge of Addiction   a. Goal: Develop Increase awareness of the Disease of Addiction and Relapse triggers and coping strategies needed to effectively deal with them that support long-term sobriety in 90 Days  b. Objectives:   iii. 1) I'd 3 Ways to achieve a quality of life that is free from using all MAS on a continuing bases in 90 Days  Evaluation Date: 6/1/2022  Code: C Continue TBD  i. 2) I'd 4 Strategies to manage urges to lapse back into substance use in 90 Days Evaluation Date: 6/1/2022 Code: C Continue TBD  ii. 3) I'd 4 Stages of Recovery in 90 Days Evaluation Date: 6/1/2022             Code: C Continue TBD     Defer:  Client would like to get a different job. Discharge Plan/Instructions: Complete treatment plan goals and objectives and maintain sobriety. Brayden Marlow / 1970 has participated in the treatment plan development outlined above on 3/1/2022.      Aneesh Ruff, [facility]  3/1/2022/3:02 PM

## 2022-03-01 NOTE — PROGRESS NOTES
[facility]        Individual  Progress Note    Location: [x] [place] [] Claudia [place]                   Patient Name: Froilan Melendrez   : 1970     Case # :  [id number]  Therapist: YESENIA Jones        Objective/Service/Time: Kept 1 hour assessment    S-Client is a 45year old  male self referred after spending 45 days in [facility] for IV Heroin and oral Xanax use. Client is , unemployed and the father of 4 adult children. O-Client was cooperative, his thought process was logical, his mood euthymic, his affect full and speech normal. Client denies any hallucinations or delusions. No HI/SI. A-Completed intake paperwork, began assessment and administered initial UDS.  Client met criteria for Level 2.1     P-Client will begin IOP Wednesday 3/2/2022 at 9:00am        Electronically signed by Eliecer Aguayo on 3/1/2022 at 2:56 PM

## 2022-03-01 NOTE — PROGRESS NOTES
[facility]                     CLINICAL DIAGNOSIS SUMMARY    Location: [x] [place] [] [place]                   Patient Name: Bobby Merrill   : 1970     Case # :  [id number]  Therapist: YESENIA Kent      1. Identifying information:  Bobby Merrill / 1970         Client is a 45year old  male self referred after completing [employer]. He went through detox for heroin and Xanax, spent 45 days inpatient. Client is , unemployed and the father to 4 adult children. 2.  Substance use history:  F10.20 Other and unspecified alcohol dependence/unspecified drinking behavior, F11.20 Opioid dependence-unspecified use, F14.20 Cocaine dependence-unspecified use and F12.10 Nondependent cannabis abuse-unspecified use       Client reports his first use of marijuana at age 14 smoking occasionally. At [age] smoking daily 1/4 ounce, [age] smoking on and off. In the past 10 years 1 time a month a couple of hits. Last use 2021  Client reports first use of alcohol age 14 drinking weekends, 4 beers. At 17 drinking every weekend drinking in the bar until blacking out through age 28. He had a seizure and went into cardiac arrest. He had a heart cath and it ended up poking a hole in his heart. He quit drinking. Last use  1 beer  Client reports first use of Cocaine age 12 snorted 2 lines. Used 2x a month until age 21. Age [age] used every weekend 1/4 gram. Age [age] then quit using. Last use 2021 1 gram IV  Client reports opiates at [age] broke hip/pelvis he was prescribed 8 Loritab 10/500 a day plus 2 Oxy 40 Mg. He abused from the start. He used and bought off the streets. VARGAS busted his Doctor, he went to Heroin age 27. Last use 2022 1/2 gram IV     3.  Consequences of substance use: (personal, inter-personal, legal, occupational, medical, nutritional,       Leisure, spiritual, etc.)         He is unemployed and is not looking to work for himself right now. Client has an extensive legal history with drug possession, thefts, breaking and entering and stolen property. He spent time in prison a couple of times. 4. Co-existing problems;  (mental health, psychiatric, previous treatment programs, family       Problems, social, educational, etc.)         Client denies any diagnosis but self reports anxiety. Client's wife is an addict and is in [employer] currently  Client has been to [facility] [facility], [employer], [facility]    5. Treatment needs, barriers to treatment, impact of disease on life:       Client needs to build sober support and find employment     Summary of Medical History:  Prior to Admission medications    Medication Sig Start Date End Date Taking? Authorizing Provider   levETIRAcetam (KEPPRA) 500 MG tablet Take 1 tablet by mouth 2 times daily 1/10/22   Keeley Estevez MD   naproxen (NAPROSYN) 500 MG tablet Take 1 tablet by mouth 2 times daily 6/26/17   Miriam Wong PA-C   Magic Mouthwash (MIRACLE MOUTHWASH) Swish and spit 5 mLs 4 times daily as needed for Dental Pain Equal parts mixture benadryl, maalox, and lidocaine. 6/26/17   Stephania Mcknight PA-C     Past Surgical History:   Procedure Laterality Date    CARDIAC CATHETERIZATION      FINGER AMPUTATION      right first finger     Past Medical History:   Diagnosis Date    Cardiac arrest [facility])     states cardiac arrest in 18 yrs ago    Seizures [facility])      Patient Active Problem List    Diagnosis Date Noted    Abscess 09/03/2019    Cellulitis 09/02/2019    Seizure ([facility] [address] 08/28/2014       6. Level of Care Determination:      2.1 Intensive Outpatient Services   7. Treatment available      __x__yes   _____no         8.   Name of program referred:    __x__[facility],    ____[facility],       _______other/identify     Electronically signed by Doc Gary on 3/1/2022 at 3:30 PM

## 2022-03-02 ENCOUNTER — HOSPITAL ENCOUNTER (OUTPATIENT)
Dept: PSYCHIATRY | Age: 52
Setting detail: THERAPIES SERIES
Discharge: HOME | End: 2022-03-02
Payer: MEDICAID

## 2022-03-02 NOTE — GROUP NOTE
[facility] Group Therapy Note      3/2/2022    Location:  [place]      Clients Presents: [phone]    Clients Absent: [phone]    Length of session: 3.0 hours    Group Note: IOP    Group Type: Co-Ed    New members were welcomed and introduced. Norms and expectations of group were discussed. Content: GROUP CHECKED-IN  TOPIC:  \"Types of Denial\" - \"Denial Pattern Checklist\" - \"Defense Mechanisms of the Addictive Personality\"  Clients learned about types of Denial, Denial Patterns, and how to overcome them. Clients identified Defense Mechanisms of the Addictive Personality and strategies to overcome them. Shandra Zamudio  6/0/8709 1:95 PM        [email or web address] Individual Group Progress Note    Natanael Merrill  1970  3/2/2022    Notes on Client Progress in Group    Reason for Absence: CX-Client reports having an appointment at [facility] for their MAT/Vivitrol program. Client will sign HADLEY for [employer] on Friday.      Shandra Zamudio  5/6/6453 8:99 PM    Co-Therapist: N/A

## 2022-03-04 ENCOUNTER — HOSPITAL ENCOUNTER (OUTPATIENT)
Dept: PSYCHIATRY | Age: 52
Setting detail: THERAPIES SERIES
Discharge: HOME | End: 2022-03-04
Payer: MEDICAID

## 2022-03-04 NOTE — GROUP NOTE
[facility] Group Therapy Note      3/4/2022    Location:  [facility] Presents: [phone]    Clients Absent: [phone]    Length of session: 3.0 hours    Group Note: IOP    Group Type: Co-Ed    New members were welcomed and introduced. Norms and expectations of group were discussed. Content: GROUP CHECKED-IN  TOPIC:  \"Basket Discussion\" - \"Managing Relapse Triggers\"  Clients participated in choosing random slips of paper in a basket and answered the questions (i.e. \"What is the scariest thing about being sober? \" \"What are some ways that you can let go of guild and shame? \" \"Tell about a time you lost control\" etc.). Clients completed a treatment worksheet where they identified external/internal cues that induce cravings that may lead to relapse along with strategies to avoid triggers?     Shandra Zamudio  3/2/8177 5:46 PM        [facility] Individual Group Progress Note    Mychal Mujica  1970  3/4/2022    Notes on Client Progress in Group    Reason for Absence: DNS - Letter of Intent will be sent    Shandra Zamudio  6/9/5361 1:55 PM    Co-Therapist: N/A

## 2022-03-07 ENCOUNTER — HOSPITAL ENCOUNTER (OUTPATIENT)
Dept: PSYCHIATRY | Age: 52
Setting detail: THERAPIES SERIES
Discharge: HOME | End: 2022-03-07
Payer: MEDICAID

## 2022-03-07 NOTE — GROUP NOTE
[facility] Group Therapy Note      3/7/2022    Location:  [place]      Clients Presents: [phone]     Clients Absent: [phone]    Length of session: 3.0 hours    Group Note: IOP    Group Type: Co-Ed    New members were welcomed and introduced. Norms and expectations of group were discussed. Content: Group Checked-In  TOPIC:  \"5 Surprising Benefits of Sobriety\" - \"My Goals Worksheet\"  Clients explored the benefits of maintaining sobriety. Clients completed treatment worksheet where they developed short and long-term goals.     Shandra Zamudio  7/4/1292 69:19 PM          [facility] Individual Group Progress Note    Chad Coronado  1970  3/7/2022    Notes on Client Progress in Group    Reason for Absence: DNS - Remove from IOP schedule    Shandra Zamudio  1/7/9778 0:13 PM    Co-Therapist: N/A

## 2022-03-08 ENCOUNTER — APPOINTMENT (OUTPATIENT)
Dept: PSYCHIATRY | Age: 52
End: 2022-03-08
Payer: MEDICAID

## 2022-03-09 ENCOUNTER — APPOINTMENT (OUTPATIENT)
Dept: PSYCHIATRY | Age: 52
End: 2022-03-09
Payer: MEDICAID

## 2022-03-11 ENCOUNTER — APPOINTMENT (OUTPATIENT)
Dept: PSYCHIATRY | Age: 52
End: 2022-03-11
Payer: MEDICAID

## 2022-03-14 ENCOUNTER — APPOINTMENT (OUTPATIENT)
Dept: PSYCHIATRY | Age: 52
End: 2022-03-14
Payer: MEDICAID

## 2022-03-15 ENCOUNTER — APPOINTMENT (OUTPATIENT)
Dept: PSYCHIATRY | Age: 52
End: 2022-03-15
Payer: MEDICAID

## 2022-03-16 ENCOUNTER — APPOINTMENT (OUTPATIENT)
Dept: PSYCHIATRY | Age: 52
End: 2022-03-16
Payer: MEDICAID

## 2022-03-18 ENCOUNTER — APPOINTMENT (OUTPATIENT)
Dept: PSYCHIATRY | Age: 52
End: 2022-03-18
Payer: MEDICAID

## 2022-03-21 ENCOUNTER — APPOINTMENT (OUTPATIENT)
Dept: PSYCHIATRY | Age: 52
End: 2022-03-21
Payer: MEDICAID

## 2022-03-22 ENCOUNTER — APPOINTMENT (OUTPATIENT)
Dept: PSYCHIATRY | Age: 52
End: 2022-03-22
Payer: MEDICAID

## 2022-03-23 ENCOUNTER — APPOINTMENT (OUTPATIENT)
Dept: PSYCHIATRY | Age: 52
End: 2022-03-23
Payer: MEDICAID

## 2022-03-25 ENCOUNTER — APPOINTMENT (OUTPATIENT)
Dept: PSYCHIATRY | Age: 52
End: 2022-03-25
Payer: MEDICAID

## 2022-03-28 ENCOUNTER — APPOINTMENT (OUTPATIENT)
Dept: PSYCHIATRY | Age: 52
End: 2022-03-28
Payer: MEDICAID

## 2022-03-29 ENCOUNTER — APPOINTMENT (OUTPATIENT)
Dept: PSYCHIATRY | Age: 52
End: 2022-03-29
Payer: MEDICAID

## 2022-03-30 ENCOUNTER — APPOINTMENT (OUTPATIENT)
Dept: PSYCHIATRY | Age: 52
End: 2022-03-30
Payer: MEDICAID

## 2023-08-17 ENCOUNTER — HOSPITAL ENCOUNTER (OUTPATIENT)
Age: 53
Discharge: HOME | End: 2023-08-17
Payer: MEDICAID

## 2023-08-17 LAB
ALBUMIN SERPL-MCNC: 4.8 GM/DL (ref 3.4–5)
ALP BLD-CCNC: 106 IU/L (ref 40–128)
ALT SERPL-CCNC: 35 U/L (ref 10–40)
ANION GAP SERPL CALCULATED.3IONS-SCNC: 11 MMOL/L (ref 4–16)
AST SERPL-CCNC: 32 IU/L (ref 15–37)
BASOPHILS ABSOLUTE: 0.1 K/CU MM
BASOPHILS RELATIVE PERCENT: 1 % (ref 0–1)
BILIRUB SERPL-MCNC: 0.4 MG/DL (ref 0–1)
BUN SERPL-MCNC: 19 MG/DL (ref 6–23)
CALCIUM SERPL-MCNC: 10.1 MG/DL (ref 8.3–10.6)
CHLORIDE BLD-SCNC: 100 MMOL/L (ref 99–110)
CHOLEST SERPL-MCNC: 213 MG/DL
CO2: 25 MMOL/L (ref 21–32)
CREAT SERPL-MCNC: 0.9 MG/DL (ref 0.9–1.3)
DIFFERENTIAL TYPE: ABNORMAL
EOSINOPHILS ABSOLUTE: 0.1 K/CU MM
EOSINOPHILS RELATIVE PERCENT: 1.8 % (ref 0–3)
ESTIMATED AVERAGE GLUCOSE: 111 MG/DL
GFR SERPL CREATININE-BSD FRML MDRD: >60 ML/MIN/1.73M2
GLUCOSE SERPL-MCNC: 85 MG/DL (ref 70–99)
HBA1C MFR BLD: 5.5 % (ref 4.2–6.3)
HCT VFR BLD CALC: 45.1 % (ref 42–52)
HDLC SERPL-MCNC: 70 MG/DL
HEMOGLOBIN: 14.9 GM/DL (ref 13.5–18)
IMMATURE NEUTROPHIL %: 0.4 % (ref 0–0.43)
LDLC SERPL CALC-MCNC: 125 MG/DL
LYMPHOCYTES ABSOLUTE: 2 K/CU MM
LYMPHOCYTES RELATIVE PERCENT: 27.6 % (ref 24–44)
MCH RBC QN AUTO: 28.9 PG (ref 27–31)
MCHC RBC AUTO-ENTMCNC: 33 % (ref 32–36)
MCV RBC AUTO: 87.4 FL (ref 78–100)
MONOCYTES ABSOLUTE: 0.5 K/CU MM
MONOCYTES RELATIVE PERCENT: 6.5 % (ref 0–4)
NUCLEATED RBC %: 0 %
PDW BLD-RTO: 12.9 % (ref 11.7–14.9)
PLATELET # BLD: 292 K/CU MM (ref 140–440)
PMV BLD AUTO: 10 FL (ref 7.5–11.1)
POTASSIUM SERPL-SCNC: 5.2 MMOL/L (ref 3.5–5.1)
RBC # BLD: 5.16 M/CU MM (ref 4.6–6.2)
SEGMENTED NEUTROPHILS ABSOLUTE COUNT: 4.5 K/CU MM
SEGMENTED NEUTROPHILS RELATIVE PERCENT: 62.7 % (ref 36–66)
SODIUM BLD-SCNC: 136 MMOL/L (ref 135–145)
TOTAL IMMATURE NEUTOROPHIL: 0.03 K/CU MM
TOTAL NUCLEATED RBC: 0 K/CU MM
TOTAL PROTEIN: 7.3 GM/DL (ref 6.4–8.2)
TRIGL SERPL-MCNC: 91 MG/DL
TSH SERPL DL<=0.005 MIU/L-ACNC: 1.84 UIU/ML (ref 0.27–4.2)
WBC # BLD: 7.2 K/CU MM (ref 4–10.5)

## 2023-08-17 PROCEDURE — 36415 COLL VENOUS BLD VENIPUNCTURE: CPT

## 2023-08-17 PROCEDURE — 87522 HEPATITIS C REVRS TRNSCRPJ: CPT

## 2023-08-17 PROCEDURE — 80053 COMPREHEN METABOLIC PANEL: CPT

## 2023-08-17 PROCEDURE — 83036 HEMOGLOBIN GLYCOSYLATED A1C: CPT

## 2023-08-17 PROCEDURE — 84154 ASSAY OF PSA FREE: CPT

## 2023-08-17 PROCEDURE — 84443 ASSAY THYROID STIM HORMONE: CPT

## 2023-08-17 PROCEDURE — 80061 LIPID PANEL: CPT

## 2023-08-17 PROCEDURE — 86803 HEPATITIS C AB TEST: CPT

## 2023-08-17 PROCEDURE — 85025 COMPLETE CBC W/AUTO DIFF WBC: CPT

## 2023-08-17 PROCEDURE — 84153 ASSAY OF PSA TOTAL: CPT

## 2023-08-19 LAB
HCV AB SERPL QL IA: >11 IV
HCV AB SERPL QL IA: ABNORMAL
PSA FREE MFR SERPL: <33 %
PSA FREE SERPL-MCNC: <0.1 NG/ML
PSA SERPL IA-MCNC: 0.3 NG/ML (ref 0–4)

## 2023-08-20 LAB
HCV RNA SERPL NAA+PROBE-ACNC: ABNORMAL IU/ML
HCV RNA SERPL NAA+PROBE-LOG IU: 6.91 LOG IU/ML
HCV RNA SERPL QL NAA+PROBE: DETECTED

## 2024-09-18 ENCOUNTER — HOSPITAL ENCOUNTER (EMERGENCY)
Age: 54
Discharge: HOME | End: 2024-09-18
Attending: EMERGENCY MEDICINE
Payer: MEDICAID

## 2024-09-18 ENCOUNTER — APPOINTMENT (OUTPATIENT)
Dept: ULTRASOUND IMAGING | Age: 54
End: 2024-09-18
Payer: MEDICAID

## 2024-09-18 ENCOUNTER — APPOINTMENT (OUTPATIENT)
Dept: GENERAL RADIOLOGY | Age: 54
End: 2024-09-18
Payer: MEDICAID

## 2024-09-18 VITALS
RESPIRATION RATE: 18 BRPM | HEIGHT: 66 IN | SYSTOLIC BLOOD PRESSURE: 98 MMHG | BODY MASS INDEX: 27.32 KG/M2 | OXYGEN SATURATION: 98 % | HEART RATE: 80 BPM | TEMPERATURE: 98.2 F | WEIGHT: 170 LBS | DIASTOLIC BLOOD PRESSURE: 88 MMHG

## 2024-09-18 DIAGNOSIS — R39.11 URINARY HESITANCY: ICD-10-CM

## 2024-09-18 DIAGNOSIS — B02.9 HERPES ZOSTER WITHOUT COMPLICATION: ICD-10-CM

## 2024-09-18 DIAGNOSIS — R60.0 LOWER EXTREMITY EDEMA: ICD-10-CM

## 2024-09-18 DIAGNOSIS — K08.89 PAIN, DENTAL: ICD-10-CM

## 2024-09-18 DIAGNOSIS — Z87.19 HX OF CIRRHOSIS: ICD-10-CM

## 2024-09-18 DIAGNOSIS — G89.18 ACUTE POSTOPERATIVE PAIN: Primary | ICD-10-CM

## 2024-09-18 LAB
ALBUMIN SERPL-MCNC: 4.4 G/DL (ref 3.4–5)
ALBUMIN/GLOB SERPL: 2 {RATIO} (ref 1.1–2.2)
ALP SERPL-CCNC: 88 U/L (ref 40–129)
ALT SERPL-CCNC: 17 U/L (ref 10–40)
ANION GAP SERPL CALCULATED.3IONS-SCNC: 11 MMOL/L (ref 9–17)
AST SERPL-CCNC: 24 U/L (ref 15–37)
BASOPHILS # BLD: 0.04 K/UL
BASOPHILS NFR BLD: 1 % (ref 0–1)
BILIRUB SERPL-MCNC: <0.2 MG/DL (ref 0–1)
BILIRUB UR QL STRIP: NEGATIVE
BNP SERPL-MCNC: 78 PG/ML (ref 0–125)
BUN SERPL-MCNC: 15 MG/DL (ref 7–20)
CALCIUM SERPL-MCNC: 9.8 MG/DL (ref 8.3–10.6)
CHLORIDE SERPL-SCNC: 101 MMOL/L (ref 99–110)
CLARITY UR: CLEAR
CO2 SERPL-SCNC: 27 MMOL/L (ref 21–32)
COLOR UR: YELLOW
COMMENT: NORMAL
CREAT SERPL-MCNC: 0.9 MG/DL (ref 0.9–1.3)
EOSINOPHIL # BLD: 0.19 K/UL
EOSINOPHILS RELATIVE PERCENT: 2 % (ref 0–3)
ERYTHROCYTE [DISTWIDTH] IN BLOOD BY AUTOMATED COUNT: 12.3 % (ref 11.7–14.9)
GFR, ESTIMATED: 89 ML/MIN/1.73M2
GLUCOSE SERPL-MCNC: 76 MG/DL (ref 74–99)
GLUCOSE UR STRIP-MCNC: NEGATIVE MG/DL
HCT VFR BLD AUTO: 44.2 % (ref 42–52)
HGB BLD-MCNC: 14.8 G/DL (ref 13.5–18)
HGB UR QL STRIP.AUTO: NEGATIVE
IMM GRANULOCYTES # BLD AUTO: 0.02 K/UL
IMM GRANULOCYTES NFR BLD: 0 %
KETONES UR STRIP-MCNC: NEGATIVE MG/DL
LEUKOCYTE ESTERASE UR QL STRIP: NEGATIVE
LIPASE SERPL-CCNC: 26 U/L (ref 13–60)
LYMPHOCYTES NFR BLD: 2.19 K/UL
LYMPHOCYTES RELATIVE PERCENT: 26 % (ref 24–44)
MCH RBC QN AUTO: 29.5 PG (ref 27–31)
MCHC RBC AUTO-ENTMCNC: 33.5 G/DL (ref 32–36)
MCV RBC AUTO: 88 FL (ref 78–100)
MONOCYTES NFR BLD: 0.55 K/UL
MONOCYTES NFR BLD: 7 % (ref 0–4)
NEUTROPHILS NFR BLD: 65 % (ref 36–66)
NEUTS SEG NFR BLD: 5.5 K/UL
NITRITE UR QL STRIP: NEGATIVE
PH UR STRIP: 7.5 [PH] (ref 5–8)
PLATELET # BLD AUTO: 268 K/UL (ref 140–440)
PMV BLD AUTO: 9.6 FL (ref 7.5–11.1)
POTASSIUM SERPL-SCNC: 4.5 MMOL/L (ref 3.5–5.1)
PROT SERPL-MCNC: 6.5 G/DL (ref 6.4–8.2)
PROT UR STRIP-MCNC: NEGATIVE MG/DL
RBC # BLD AUTO: 5.02 M/UL (ref 4.6–6.2)
SODIUM SERPL-SCNC: 139 MMOL/L (ref 136–145)
SP GR UR STRIP: 1.01 (ref 1–1.03)
UROBILINOGEN UR STRIP-ACNC: 0.2 EU/DL (ref 0–1)
WBC OTHER # BLD: 8.5 K/UL (ref 4–10.5)

## 2024-09-18 PROCEDURE — 99285 EMERGENCY DEPT VISIT HI MDM: CPT

## 2024-09-18 PROCEDURE — 81003 URINALYSIS AUTO W/O SCOPE: CPT

## 2024-09-18 PROCEDURE — 96374 THER/PROPH/DIAG INJ IV PUSH: CPT

## 2024-09-18 PROCEDURE — 85025 COMPLETE CBC W/AUTO DIFF WBC: CPT

## 2024-09-18 PROCEDURE — 80053 COMPREHEN METABOLIC PANEL: CPT

## 2024-09-18 PROCEDURE — 83690 ASSAY OF LIPASE: CPT

## 2024-09-18 PROCEDURE — 93005 ELECTROCARDIOGRAM TRACING: CPT | Performed by: EMERGENCY MEDICINE

## 2024-09-18 PROCEDURE — 6370000000 HC RX 637 (ALT 250 FOR IP): Performed by: EMERGENCY MEDICINE

## 2024-09-18 PROCEDURE — 93970 EXTREMITY STUDY: CPT

## 2024-09-18 PROCEDURE — 96375 TX/PRO/DX INJ NEW DRUG ADDON: CPT

## 2024-09-18 PROCEDURE — 71045 X-RAY EXAM CHEST 1 VIEW: CPT

## 2024-09-18 PROCEDURE — 6360000002 HC RX W HCPCS: Performed by: EMERGENCY MEDICINE

## 2024-09-18 PROCEDURE — 83880 ASSAY OF NATRIURETIC PEPTIDE: CPT

## 2024-09-18 RX ORDER — TETRACAINE HYDROCHLORIDE 5 MG/ML
2 SOLUTION OPHTHALMIC ONCE
Status: COMPLETED | OUTPATIENT
Start: 2024-09-18 | End: 2024-09-18

## 2024-09-18 RX ORDER — VALACYCLOVIR HYDROCHLORIDE 1 G/1
1000 TABLET, FILM COATED ORAL 3 TIMES DAILY
Qty: 30 TABLET | Refills: 0 | Status: SHIPPED | OUTPATIENT
Start: 2024-09-18 | End: 2024-09-28

## 2024-09-18 RX ORDER — TAMSULOSIN HYDROCHLORIDE 0.4 MG/1
0.4 CAPSULE ORAL DAILY
Qty: 30 CAPSULE | Refills: 0 | Status: SHIPPED | OUTPATIENT
Start: 2024-09-18

## 2024-09-18 RX ORDER — CLINDAMYCIN HCL 300 MG
300 CAPSULE ORAL 4 TIMES DAILY
Qty: 40 CAPSULE | Refills: 0 | Status: SHIPPED | OUTPATIENT
Start: 2024-09-18 | End: 2024-09-28

## 2024-09-18 RX ORDER — VALACYCLOVIR HYDROCHLORIDE 500 MG/1
1000 TABLET, FILM COATED ORAL DAILY
Status: DISCONTINUED | OUTPATIENT
Start: 2024-09-18 | End: 2024-09-18 | Stop reason: HOSPADM

## 2024-09-18 RX ORDER — TRAMADOL HYDROCHLORIDE 50 MG/1
100 TABLET ORAL EVERY 8 HOURS PRN
Qty: 12 TABLET | Refills: 0 | Status: SHIPPED | OUTPATIENT
Start: 2024-09-18 | End: 2024-09-21

## 2024-09-18 RX ORDER — ONDANSETRON 2 MG/ML
4 INJECTION INTRAMUSCULAR; INTRAVENOUS ONCE
Status: COMPLETED | OUTPATIENT
Start: 2024-09-18 | End: 2024-09-18

## 2024-09-18 RX ORDER — MORPHINE SULFATE 4 MG/ML
4 INJECTION, SOLUTION INTRAMUSCULAR; INTRAVENOUS ONCE
Status: COMPLETED | OUTPATIENT
Start: 2024-09-18 | End: 2024-09-18

## 2024-09-18 RX ADMIN — VALACYCLOVIR HYDROCHLORIDE 1000 MG: 500 TABLET, FILM COATED ORAL at 17:03

## 2024-09-18 RX ADMIN — ONDANSETRON 4 MG: 2 INJECTION INTRAMUSCULAR; INTRAVENOUS at 16:58

## 2024-09-18 RX ADMIN — TETRACAINE HYDROCHLORIDE 2 DROP: 5 SOLUTION OPHTHALMIC at 16:58

## 2024-09-18 RX ADMIN — FLUORESCEIN SODIUM 1 MG: 1 STRIP OPHTHALMIC at 16:58

## 2024-09-18 RX ADMIN — MORPHINE SULFATE 4 MG: 4 INJECTION, SOLUTION INTRAMUSCULAR; INTRAVENOUS at 16:58

## 2024-09-18 ASSESSMENT — PAIN SCALES - GENERAL
PAINLEVEL_OUTOF10: 9
PAINLEVEL_OUTOF10: 8
PAINLEVEL_OUTOF10: 6

## 2024-09-18 ASSESSMENT — PAIN DESCRIPTION - LOCATION
LOCATION: TEETH
LOCATION: FACE;HEAD;JAW

## 2024-09-18 ASSESSMENT — PAIN DESCRIPTION - PAIN TYPE: TYPE: ACUTE PAIN

## 2024-09-18 ASSESSMENT — LIFESTYLE VARIABLES
HOW OFTEN DO YOU HAVE A DRINK CONTAINING ALCOHOL: NEVER
HOW MANY STANDARD DRINKS CONTAINING ALCOHOL DO YOU HAVE ON A TYPICAL DAY: PATIENT DOES NOT DRINK

## 2024-09-18 ASSESSMENT — PAIN - FUNCTIONAL ASSESSMENT: PAIN_FUNCTIONAL_ASSESSMENT: 0-10

## 2024-09-21 LAB
EKG ATRIAL RATE: 66 BPM
EKG DIAGNOSIS: NORMAL
EKG P AXIS: 71 DEGREES
EKG P-R INTERVAL: 160 MS
EKG Q-T INTERVAL: 382 MS
EKG QRS DURATION: 76 MS
EKG QTC CALCULATION (BAZETT): 400 MS
EKG R AXIS: 53 DEGREES
EKG T AXIS: 43 DEGREES
EKG VENTRICULAR RATE: 66 BPM

## 2024-09-21 PROCEDURE — 93010 ELECTROCARDIOGRAM REPORT: CPT | Performed by: INTERNAL MEDICINE
